# Patient Record
Sex: MALE | Race: WHITE | NOT HISPANIC OR LATINO
[De-identification: names, ages, dates, MRNs, and addresses within clinical notes are randomized per-mention and may not be internally consistent; named-entity substitution may affect disease eponyms.]

---

## 2019-06-19 PROBLEM — Z00.00 ENCOUNTER FOR PREVENTIVE HEALTH EXAMINATION: Status: ACTIVE | Noted: 2019-06-19

## 2019-07-17 ENCOUNTER — APPOINTMENT (OUTPATIENT)
Dept: OTOLARYNGOLOGY | Facility: CLINIC | Age: 49
End: 2019-07-17
Payer: MEDICAID

## 2019-07-17 VITALS
WEIGHT: 207 LBS | BODY MASS INDEX: 36.68 KG/M2 | SYSTOLIC BLOOD PRESSURE: 158 MMHG | DIASTOLIC BLOOD PRESSURE: 96 MMHG | HEART RATE: 95 BPM | HEIGHT: 63 IN

## 2019-07-17 PROCEDURE — 92567 TYMPANOMETRY: CPT

## 2019-07-17 PROCEDURE — 31231 NASAL ENDOSCOPY DX: CPT

## 2019-07-17 PROCEDURE — 99204 OFFICE O/P NEW MOD 45 MIN: CPT | Mod: 25

## 2019-07-17 PROCEDURE — G0268 REMOVAL OF IMPACTED WAX MD: CPT

## 2019-07-17 PROCEDURE — 92557 COMPREHENSIVE HEARING TEST: CPT

## 2019-07-17 NOTE — DATA REVIEWED
[de-identified] : I personally reviewed the patient's audiogram, which shows left mixed hearing loss with large air bone gap, asymmetry in high frequency bone conduction thresholds

## 2019-07-17 NOTE — HISTORY OF PRESENT ILLNESS
[de-identified] : 48M with left hearing loss for years.   Constant, nonfluctuating, gradually worsening.  Recurring ear infections in childhood, always in left ear.  No pain, no drainage currently.  Occasional tinnitus both ears.  Some seasonal allergies and chronic nasal congestion.  Smokes 3 ppd.  No prior ear surgeries or tubes.  Patient is not diabetic.  Distant history of cocaine abuse.

## 2019-07-17 NOTE — REASON FOR VISIT
[Initial Consultation] : an initial consultation for [Hearing Loss] : hearing loss [FreeTextEntry2] : hearing loss

## 2019-07-17 NOTE — PROCEDURE
[FreeTextEntry3] : Procedure note:  Bilateral cerumenectomy\par \par Jul 17, 2019 \par \par Description of Procedure:   Bilateral cerumen impactions were noted requiring debridement under the operating microscope using otologic instrumentation.  The patient tolerated the procedure without complications.\par \par  [FreeTextEntry6] : Procedure note: Nasal endoscopy\par \par Jul 17, 2019 \par \par Description of Procedure:  Nasal endoscopy was performed because of recalcitrant symptoms of nasal obstruction and/or chronic rhinitis, and anterior anatomic abnormalities precluding visualization. Topical decongestant and/or anesthetic was administered to the nasal cavity.  Using a flexible endoscope with sheath, the nasal mucosa, septum, turbinates, and ostiomeatal complex were examined.  \par \par Nasal mucosa findings:  the nasal mucosa was edematous\par Septum findings:  the septum was deviated to the left\par Nasopharynx findings:  the nasopharynx showed enlargement of posterior ET pillar bilaterally, appears symmetric.\par Middle meatus findings:  the middle meatus had no abnormalities.\par Sinuses findings:  the paranasal sinuses had no abnormalities.\par \par Crowding in oropharynx, symmetric enlargement of lingual tonsils, b/l VC mobile, no laryngeal lesions\par

## 2019-07-17 NOTE — PHYSICAL EXAM
[de-identified] : R TM normal, L TM with thickening preventing good visualization of middle ear, attic retraction but no cholesteatoma [Normal] : no rashes

## 2019-07-17 NOTE — REVIEW OF SYSTEMS
[Seasonal Allergies] : seasonal allergies [Negative] : Heme/Lymph [Hearing Loss] : hearing loss [Nasal Congestion] : nasal congestion [Problem Snoring] : problem snoring [Hoarseness] : hoarseness [Eye Pain] : eye pain [Eyes Itch] : itching of the eyes [Shortness Of Breath] : shortness of breath [Wheezing] : wheezing [Cough] : cough [Heartburn] : heartburn [Joint Pain] : joint pain [Depression] : depression [Muscle Weakness] : muscle weakness [FreeTextEntry3] : WATERY EYES [FreeTextEntry6] : NOISY BREATHING [FreeTextEntry1] : SWEATING AT NIGHT, HEADACHE

## 2019-07-31 ENCOUNTER — APPOINTMENT (OUTPATIENT)
Dept: OTOLARYNGOLOGY | Facility: CLINIC | Age: 49
End: 2019-07-31

## 2019-08-28 ENCOUNTER — APPOINTMENT (OUTPATIENT)
Dept: OTOLARYNGOLOGY | Facility: CLINIC | Age: 49
End: 2019-08-28

## 2019-08-28 DIAGNOSIS — H61.23 IMPACTED CERUMEN, BILATERAL: ICD-10-CM

## 2019-08-28 DIAGNOSIS — J31.0 CHRONIC RHINITIS: ICD-10-CM

## 2019-10-16 ENCOUNTER — APPOINTMENT (OUTPATIENT)
Dept: OTOLARYNGOLOGY | Facility: CLINIC | Age: 49
End: 2019-10-16
Payer: MEDICAID

## 2019-10-16 VITALS
HEART RATE: 111 BPM | HEIGHT: 63 IN | DIASTOLIC BLOOD PRESSURE: 100 MMHG | BODY MASS INDEX: 36.68 KG/M2 | WEIGHT: 207 LBS | SYSTOLIC BLOOD PRESSURE: 152 MMHG

## 2019-10-16 DIAGNOSIS — H90.72 MIXED CONDUCTIVE AND SENSORINEURAL HEARING LOSS, UNILATERAL, LEFT EAR, WITH UNRESTRICTED HEARING ON THE CONTRALATERAL SIDE: ICD-10-CM

## 2019-10-16 DIAGNOSIS — H68.022 CHRONIC EUSTACHIAN SALPINGITIS, LEFT EAR: ICD-10-CM

## 2019-10-16 PROCEDURE — 69433 CREATE EARDRUM OPENING: CPT | Mod: LT

## 2019-10-16 PROCEDURE — 99214 OFFICE O/P EST MOD 30 MIN: CPT | Mod: 25

## 2019-10-16 PROCEDURE — 92567 TYMPANOMETRY: CPT

## 2019-10-16 PROCEDURE — 92553 AUDIOMETRY AIR & BONE: CPT

## 2019-10-16 NOTE — PROCEDURE
[FreeTextEntry3] : Procedure note: Left myringotomy and tube insertion\par \par Oct 16, 2019 \par \par Description of Operative Procedure:  Risks, benefits, and alternatives of the planned procedure were discussed with the patient prior to proceeding.  Risks would include but are not limited to bleeding, infection, persistent drainage, persistent perforation, or failure to improve hearing.  Benefits would include improvement in hearing.  Topical anesthetic was used to anesthetize the tympanic membrane.  A myringotomy was made.  Serous fluid was suctioned.  A size 1.14 Paparella tube was placed followed by Ciprodex drops.  The patient tolerated the procedure without complications.\par \par

## 2019-11-27 ENCOUNTER — APPOINTMENT (OUTPATIENT)
Dept: OTOLARYNGOLOGY | Facility: CLINIC | Age: 49
End: 2019-11-27

## 2021-03-03 ENCOUNTER — APPOINTMENT (OUTPATIENT)
Dept: HEMATOLOGY ONCOLOGY | Facility: CLINIC | Age: 51
End: 2021-03-03
Payer: MEDICAID

## 2021-03-03 VITALS
DIASTOLIC BLOOD PRESSURE: 88 MMHG | BODY MASS INDEX: 44.48 KG/M2 | OXYGEN SATURATION: 95 % | SYSTOLIC BLOOD PRESSURE: 133 MMHG | WEIGHT: 251.01 LBS | HEART RATE: 111 BPM | HEIGHT: 63 IN | TEMPERATURE: 97.3 F

## 2021-03-03 DIAGNOSIS — F32.9 ANXIETY DISORDER, UNSPECIFIED: ICD-10-CM

## 2021-03-03 DIAGNOSIS — Z02.82 ENCOUNTER FOR ADOPTION SERVICES: ICD-10-CM

## 2021-03-03 DIAGNOSIS — Z87.09 PERSONAL HISTORY OF OTHER DISEASES OF THE RESPIRATORY SYSTEM: ICD-10-CM

## 2021-03-03 DIAGNOSIS — R73.03 PREDIABETES.: ICD-10-CM

## 2021-03-03 DIAGNOSIS — Z87.898 PERSONAL HISTORY OF OTHER SPECIFIED CONDITIONS: ICD-10-CM

## 2021-03-03 DIAGNOSIS — Z80.9 FAMILY HISTORY OF MALIGNANT NEOPLASM, UNSPECIFIED: ICD-10-CM

## 2021-03-03 DIAGNOSIS — D75.1 SECONDARY POLYCYTHEMIA: ICD-10-CM

## 2021-03-03 DIAGNOSIS — F17.200 NICOTINE DEPENDENCE, UNSPECIFIED, UNCOMPLICATED: ICD-10-CM

## 2021-03-03 DIAGNOSIS — Z86.39 PERSONAL HISTORY OF OTHER ENDOCRINE, NUTRITIONAL AND METABOLIC DISEASE: ICD-10-CM

## 2021-03-03 DIAGNOSIS — Z87.39 PERSONAL HISTORY OF OTHER DISEASES OF THE MUSCULOSKELETAL SYSTEM AND CONNECTIVE TISSUE: ICD-10-CM

## 2021-03-03 DIAGNOSIS — F41.9 ANXIETY DISORDER, UNSPECIFIED: ICD-10-CM

## 2021-03-03 PROCEDURE — 99072 ADDL SUPL MATRL&STAF TM PHE: CPT

## 2021-03-03 PROCEDURE — 99205 OFFICE O/P NEW HI 60 MIN: CPT

## 2021-03-03 NOTE — REVIEW OF SYSTEMS
[Patient Intake Form Reviewed] : Patient intake form was reviewed [Fever] : no fever [Chills] : no chills [Fatigue] : fatigue [Recent Change In Weight] : ~T recent weight change [Vision Problems] : no vision problems [Dysphagia] : no dysphagia [Loss of Hearing] : loss of hearing [Chest Pain] : no chest pain [Leg Claudication] : no intermittent leg claudication [Lower Ext Edema] : no lower extremity edema [Shortness Of Breath] : shortness of breath [Wheezing] : wheezing [Cough] : no cough [Abdominal Pain] : no abdominal pain [Joint Pain] : joint pain [Joint Stiffness] : joint stiffness [Skin Rash] : no skin rash [Fainting] : no fainting [Difficulty Walking] : no difficulty walking [Insomnia] : insomnia [Anxiety] : anxiety [Depression] : depression [Easy Bleeding] : no tendency for easy bleeding [Easy Bruising] : no tendency for easy bruising [Swollen Glands] : no swollen glands [FreeTextEntry2] : weight gain [FreeTextEntry4] : chronic hearing loss left ear

## 2021-03-03 NOTE — RESULTS/DATA
[FreeTextEntry1] : Mr. Leyva is a pleasant 50 year-old man, heavy smoker, with multiple medical problems, here for evaluation of neutrophil-predominant leukocytosis.

## 2021-03-03 NOTE — CONSULT LETTER
[Dear  ___] : Dear  [unfilled], [Consult Letter:] : I had the pleasure of evaluating your patient, [unfilled]. [Please see my note below.] : Please see my note below. [Consult Closing:] : Thank you very much for allowing me to participate in the care of this patient.  If you have any questions, please do not hesitate to contact me. [Sincerely,] : Sincerely, [FreeTextEntry2] : Dr. Bob Rojas [FreeTextEntry3] : Frankie Stock MD\par

## 2021-03-03 NOTE — HISTORY OF PRESENT ILLNESS
[de-identified] : Jose G has a long-standing history of degenerative back disease, hypertension and hyperlipidemia. He follows closely with Dr. Rojas. On recent blood work, he was found to have an elevated white blood cell count of 16.5, with a predominance of neutrophils. He was also noted to have a mildly elevated hemoglobin of 17.2g. A C-reactive protein checked at that time was elevated.\par \par He has previously seen a rheumatologist, with work-up for possible inflammatory arthritis reportedly unrevealing. He is pending neurosurgical evaluation. He has bilateral nodular lesions on the extensor surfaces of his forearms, which have developed after chronically leaning on his forearms due to his back issues.\par \par Jose G smokes 2-3 packs of cigarettes per day, is pending sleep study for possible sleep apnea. \par \par He denies any ongoing fevers or chills, no headache, no lumps or bumps, no weight loss, no bleeding or bruising. He is not on any steroids, though has been previously for COPD.\par \par

## 2021-03-03 NOTE — PHYSICAL EXAM
[Restricted in physically strenuous activity but ambulatory and able to carry out work of a light or sedentary nature] : Status 1- Restricted in physically strenuous activity but ambulatory and able to carry out work of a light or sedentary nature, e.g., light house work, office work [Obese] : obese [Normal] : affect appropriate [de-identified] : anicteric [de-identified] : Decreased air entry bilaterally with diffuse expiratory wheezing

## 2021-03-04 LAB
BASOPHILS # BLD AUTO: 0.1 K/UL
BASOPHILS NFR BLD AUTO: 0.6 %
EOSINOPHIL # BLD AUTO: 0.32 K/UL
EOSINOPHIL NFR BLD AUTO: 2 %
HCT VFR BLD CALC: 50.7 %
HGB BLD-MCNC: 16.6 G/DL
IMM GRANULOCYTES NFR BLD AUTO: 1 %
LYMPHOCYTES # BLD AUTO: 3.74 K/UL
LYMPHOCYTES NFR BLD AUTO: 23.7 %
MAN DIFF?: NORMAL
MCHC RBC-ENTMCNC: 32 PG
MCHC RBC-ENTMCNC: 32.7 GM/DL
MCV RBC AUTO: 97.7 FL
MONOCYTES # BLD AUTO: 1.41 K/UL
MONOCYTES NFR BLD AUTO: 9 %
NEUTROPHILS # BLD AUTO: 10.03 K/UL
NEUTROPHILS NFR BLD AUTO: 63.7 %
PLATELET # BLD AUTO: 409 K/UL
RBC # BLD: 5.19 M/UL
RBC # FLD: 12.1 %
WBC # FLD AUTO: 15.75 K/UL

## 2021-07-02 ENCOUNTER — APPOINTMENT (OUTPATIENT)
Dept: HEMATOLOGY ONCOLOGY | Facility: CLINIC | Age: 51
End: 2021-07-02

## 2021-07-02 ENCOUNTER — OUTPATIENT (OUTPATIENT)
Dept: OUTPATIENT SERVICES | Facility: HOSPITAL | Age: 51
LOS: 1 days | End: 2021-07-02

## 2021-07-02 DIAGNOSIS — D72.829 ELEVATED WHITE BLOOD CELL COUNT, UNSPECIFIED: ICD-10-CM

## 2021-07-13 ENCOUNTER — TRANSCRIPTION ENCOUNTER (OUTPATIENT)
Age: 51
End: 2021-07-13

## 2021-07-13 ENCOUNTER — APPOINTMENT (OUTPATIENT)
Dept: NEUROSURGERY | Facility: CLINIC | Age: 51
End: 2021-07-13
Payer: MEDICAID

## 2021-07-13 VITALS
HEART RATE: 86 BPM | DIASTOLIC BLOOD PRESSURE: 80 MMHG | WEIGHT: 230 LBS | SYSTOLIC BLOOD PRESSURE: 130 MMHG | TEMPERATURE: 97.6 F | BODY MASS INDEX: 40.75 KG/M2 | HEIGHT: 63 IN

## 2021-07-13 PROCEDURE — 99204 OFFICE O/P NEW MOD 45 MIN: CPT

## 2021-07-13 RX ORDER — ALBUTEROL 90 MCG
AEROSOL (GRAM) INHALATION
Refills: 0 | Status: ACTIVE | COMMUNITY

## 2021-07-13 RX ORDER — UMECLIDINIUM BROMIDE AND VILANTEROL TRIFENATATE 62.5; 25 UG/1; UG/1
62.5-25 POWDER RESPIRATORY (INHALATION)
Refills: 0 | Status: ACTIVE | COMMUNITY

## 2021-07-13 NOTE — RESULTS/DATA
[FreeTextEntry1] : [facility] imaging of the lumbar spine shows some degenerative changes at the lowest level with some mild disc disease without significant neural impingement. It is noted that there is a transitional anatomy at the lowest level. More notable however is a cervical cord syrinx. It extends all the way from C2 down to the thoracic spine however the thoracic spine is not image.  On the sagittal cuts to my eye it appears he's got a Chiari malformation.

## 2021-07-13 NOTE — PLAN
[FreeTextEntry1] : This is a patient with a cervicothoracic syrinx and what is likely a diagnosis Chiari malformation. Recommended an MRI of the brain. I recommended an MRI of the thoracic spine to fully evaluate him. He truly is a Chiari malformation he may benefit from decompression to treat that syrinx. Otherwise like to know that there is no other etiologies such as tumor otherwise a benign image regions. I discussed this with the patient will follow up with me with the rest of the imaging hand for any decisions are made.

## 2021-07-13 NOTE — HISTORY OF PRESENT ILLNESS
[de-identified] : \par Jose G is here for an evaluation primarily of the cervical spine. Comes complaining however of only back pain. That's his main complaint in what is missing his pain management specialist for. He is an MRI of the lumbar spine as well as an MRI of the cervical spine done\par Her posterior for my review. I think what was found incidentally was that he has a large cervical syrinx. There is no MRI of the brain or the thoracic spine for my review. He complains of an interview of some symptoms that suggest numbness and tingling in the hands and some balance difficulties but these were only brought out MI questioning and a part of his primary complaint.\par \par \par \par PM  Hoon Caridad\par PCP  Crystal\par

## 2021-07-13 NOTE — PHYSICAL EXAM
[FreeTextEntry6] : He has diffusely increased tone however his motor examination is otherwise grossly unremarkable. [Antalgic] : antalgic [Able to toe walk] : the patient was not able to toe walk [Able to heel walk] : the patient was not able to heel walk

## 2021-07-13 NOTE — REASON FOR VISIT
[New Patient Visit] : a new patient visit [Referred By: _________] : Patient was referred by BARRON [FreeTextEntry1] : Cervical Syrinx- [facility]

## 2021-07-29 ENCOUNTER — APPOINTMENT (OUTPATIENT)
Dept: NEUROSURGERY | Facility: CLINIC | Age: 51
End: 2021-07-29
Payer: MEDICAID

## 2021-07-29 VITALS
BODY MASS INDEX: 40.75 KG/M2 | HEART RATE: 91 BPM | SYSTOLIC BLOOD PRESSURE: 120 MMHG | HEIGHT: 63 IN | WEIGHT: 230 LBS | DIASTOLIC BLOOD PRESSURE: 70 MMHG | TEMPERATURE: 97.3 F

## 2021-07-29 PROCEDURE — 99213 OFFICE O/P EST LOW 20 MIN: CPT

## 2021-07-29 NOTE — REASON FOR VISIT
[Follow-Up: _____] : a [unfilled] follow-up visit [FreeTextEntry1] : Jose G is here for a clinical follow-up evaluation and imaging review.  He has a cervical thoracic syrinx.  We got MRIs of his thoracic spine more properly and also of the brain.  He is complaining of increasing headaches and some gait difficulties.  He walks with a cane.

## 2021-07-29 NOTE — RESULTS/DATA
[FreeTextEntry1] : Staci Stauffer MRI reveals what is reported as basilar invagination.  It appears that she probably does have some degree of this in addition to Chiari malformation.  This is likely the etiology of his syrinx.  Is hard to know the images are somewhat limited.  Thoracic spine study somewhat incomplete showing a scoliotic curve without evidence of tumor.

## 2021-07-29 NOTE — ASSESSMENT
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  I discussed with him the different treatment options of consideration.  If the etiology of his syrinx is the invagination/Chiari then consideration for occipital cervical decompression fusion was discussed.  This is obviously quite disabling in terms of the fusion and noted loss of range of motion of the cervical spine.  I have told him however if the syrinx increases his neurologic disability could worsen.  We have to balance the risk benefits of surgery and the associated byproducts of treatment.\par \par I have given him structures to get other opinions about this and have directed him to [facility] and [facility] for expert opinions and treatment.  If the OC fusion needs to be done he may be better to consider it at a tertiary care level hospital setting.\par \par You get back to me regarding his wishes and after the other opinions have been rendered.

## 2021-07-31 ENCOUNTER — NON-APPOINTMENT (OUTPATIENT)
Age: 51
End: 2021-07-31

## 2021-08-19 ENCOUNTER — APPOINTMENT (OUTPATIENT)
Dept: NEUROSURGERY | Facility: CLINIC | Age: 51
End: 2021-08-19
Payer: MEDICAID

## 2021-08-19 VITALS
DIASTOLIC BLOOD PRESSURE: 83 MMHG | SYSTOLIC BLOOD PRESSURE: 142 MMHG | HEIGHT: 63 IN | TEMPERATURE: 98.6 F | WEIGHT: 230 LBS | HEART RATE: 101 BPM | BODY MASS INDEX: 40.75 KG/M2 | OXYGEN SATURATION: 95 %

## 2021-08-19 PROCEDURE — 99213 OFFICE O/P EST LOW 20 MIN: CPT

## 2021-08-19 NOTE — REVIEW OF SYSTEMS
[Feeling Poorly] : feeling poorly [Feeling Tired] : feeling tired [Recent Weight Gain (___ Lbs)] : recent [unfilled] ~Ulb weight gain [Sleep Disturbances] : sleep disturbances [Anxiety] : anxiety [Confused or Disoriented] : confusion [Decr. Concentrating Ability] : decreased concentrating ability [Changed Thought Patterns] : changed thought patterns [Facial Weakness] : facial weakness [Arm Weakness] : arm weakness [Hand Weakness] :  hand weakness [Leg Weakness] : leg weakness [Poor Coordination] : poor coordination [Numbness] : numbness [Tingling] : tingling [Dizziness] : dizziness [Lightheadedness] : lightheadedness [Migraine Headache] : migraine headaches [Difficulty Walking] : difficulty walking [Inability to Walk] : inability to walk [Limping] : limping

## 2021-08-20 NOTE — HISTORY OF PRESENT ILLNESS
[> 3 months] : more  than 3 months [FreeTextEntry1] : neck and posterior headaches and pain.  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  Patient is a current day smoker 2-3 packs per day.   He mentions his symptoms have been gradually worsening over the last year.  Denies any trauma or injury.   Patient endorses neck/interscapular pain with intermittent numbness/tingling of UE's.  Pain intensity 7/10.  He endorses posterior headaches which wax and wane.  Denies any saddle anesthesia, urinary or bowel incontinence.\par He ambulates with assistance of a cane.  He endorses difficulty with balance.  No falls.  Patient states he has tried physical therapy which exacerbates his pain.  He is under the care of Dr. Mclean (pain management).  He has not responded to pain management treatments.  He presents for consideration of surgical options.  \par

## 2021-08-20 NOTE — PHYSICAL EXAM
[General Appearance - Alert] : alert [General Appearance - In No Acute Distress] : in no acute distress [Oriented To Time, Place, And Person] : oriented to person, place, and time [Impaired Insight] : insight and judgment were intact [Affect] : the affect was normal [Person] : oriented to person [Place] : oriented to place [Time] : oriented to time [Short Term Intact] : short term memory intact [Fluency] : fluency intact [Comprehension] : comprehension intact [Cranial Nerves Oculomotor (III)] : extraocular motion intact [Cranial Nerves Optic (II)] : visual acuity intact bilaterally,  pupils equal round and reactive to light [Cranial Nerves Trigeminal (V)] : facial sensation intact symmetrically [Cranial Nerves Facial (VII)] : face symmetrical [Cranial Nerves Glossopharyngeal (IX)] : tongue and palate midline [Cranial Nerves Accessory (XI - Cranial And Spinal)] : head turning and shoulder shrug symmetric [Cranial Nerves Hypoglossal (XII)] : there was no tongue deviation with protrusion [Motor Tone] : muscle tone was normal in all four extremities [Motor Strength] : muscle strength was normal in all four extremities [4] : L3 quadriceps 4/5 [5] : S1 toe walking 5/5 [Sensation Tactile Decrease] : light touch was intact [Sensation Pain / Temperature Decrease] : pain and temperature was intact [Antalgic] : antalgic [FreeTextEntry8] : ambulates with assistance of a cane.  [FreeTextEntry9] : ambulates with a cane.

## 2021-08-20 NOTE — REASON FOR VISIT
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

## 2021-08-20 NOTE — ASSESSMENT
[FreeTextEntry1] : Mr. Leyva presents with above history and imaging.  Patient is neurologically intact.  He presents with imaging that reveals basilar invagination and syrinx of the cervical and thoracic spinal cord.  We discussed in detail smoking cessation.   \par Plan:\par MRI cervical and thoracic spine w/wo contrast to assess for any enhancement of the syrinx.\par MRI head with CINE to assess for any CSF obstruction.\par CT cervical spine to assess bony structures.\par Patient will follow up with Dr. Muñoz as well.\par Follow up after imaging to discuss imaging findings.\par Smoking cessation discussed.  Currently smoking 2-3 packs per day. \par Patient knows to call the office if there are any new or worsening symptoms.\par Patient has been given an opportunity to ask and have their questions answered to their satisfaction.\par \par I, Dr. Nash Araiza, personally performed the evaluation and management (E/M) services for this patient.  That E/M includes assessment of the initial examination, assessing the pertinent medical and surgical history, and establishing the plan of care.  At the time of the visit, my ACP, Sandy Lamar, actively participated in the evaluation and management services for this patient to be followed going forward in accordance with the plan documented in the clinical note.\par \par

## 2021-08-24 ENCOUNTER — APPOINTMENT (OUTPATIENT)
Dept: NEUROSURGERY | Facility: CLINIC | Age: 51
End: 2021-08-24

## 2021-09-23 ENCOUNTER — APPOINTMENT (OUTPATIENT)
Dept: NEUROSURGERY | Facility: CLINIC | Age: 51
End: 2021-09-23
Payer: MEDICAID

## 2021-09-23 VITALS
SYSTOLIC BLOOD PRESSURE: 118 MMHG | HEART RATE: 110 BPM | DIASTOLIC BLOOD PRESSURE: 77 MMHG | OXYGEN SATURATION: 93 % | TEMPERATURE: 97.8 F

## 2021-09-23 PROCEDURE — 99213 OFFICE O/P EST LOW 20 MIN: CPT

## 2021-09-27 NOTE — END OF VISIT
[Time Spent: ___ minutes] : I have spent [unfilled] minutes of time on the encounter. glen all pertinent systems normal

## 2021-09-27 NOTE — ASSESSMENT
[FreeTextEntry1] : Mr. Leyva presents with above history and imaging.  He presents with imaging that reveals basilar invagination and syrinx of the cervical and thoracic spinal cord.  We discussed in detail smoking cessation.   \par Plan:\par \par Patient will follow up with Dr. Muñoz as well.\par Smoking cessation discussed.  Currently smoking 2-3 packs per day. \par Patient knows to call the office if there are any new or worsening symptoms.\par Patient has been given an opportunity to ask and have their questions answered to their satisfaction.  \par \par The patient understands that he may likely be an appropriate candidate for posterior decompression and O-C fusion.  I will discuss the case further with Dr. Muñoz after his follow-up next week.\par \par I, Dr. Nash Araiza, personally performed the evaluation and management (E/M) services for this patient.  That E/M includes assessment of the initial examination, assessing the pertinent medical and surgical history, and establishing the plan of care.  At the time of the visit, my ACP, Sandy Lamar, actively participated in the evaluation and management services for this patient to be followed going forward in accordance with the plan documented in the clinical note.\par \par

## 2021-09-27 NOTE — PHYSICAL EXAM
[General Appearance - Alert] : alert [Oriented To Time, Place, And Person] : oriented to person, place, and time [General Appearance - In No Acute Distress] : in no acute distress [Impaired Insight] : insight and judgment were intact [Affect] : the affect was normal [Person] : oriented to person [Place] : oriented to place [Time] : oriented to time [Short Term Intact] : short term memory intact [Fluency] : fluency intact [Comprehension] : comprehension intact [Cranial Nerves Optic (II)] : visual acuity intact bilaterally,  pupils equal round and reactive to light [Cranial Nerves Oculomotor (III)] : extraocular motion intact [Cranial Nerves Trigeminal (V)] : facial sensation intact symmetrically [Cranial Nerves Facial (VII)] : face symmetrical [Cranial Nerves Glossopharyngeal (IX)] : tongue and palate midline [Cranial Nerves Accessory (XI - Cranial And Spinal)] : head turning and shoulder shrug symmetric [Cranial Nerves Hypoglossal (XII)] : there was no tongue deviation with protrusion [Motor Tone] : muscle tone was normal in all four extremities [4] : L3 quadriceps 4/5 [5] : S1 toe walking 5/5 [Sensation Tactile Decrease] : light touch was intact [Sensation Pain / Temperature Decrease] : pain and temperature was intact [Antalgic] : antalgic [FreeTextEntry6] : bilateral Hg weakness 4/5 [FreeTextEntry8] : ambulates with assistance of a cane.  [FreeTextEntry9] : ambulates with a cane.

## 2021-09-28 ENCOUNTER — APPOINTMENT (OUTPATIENT)
Dept: NEUROSURGERY | Facility: CLINIC | Age: 51
End: 2021-09-28
Payer: MEDICAID

## 2021-09-28 VITALS
TEMPERATURE: 96.7 F | DIASTOLIC BLOOD PRESSURE: 80 MMHG | HEART RATE: 96 BPM | WEIGHT: 230 LBS | SYSTOLIC BLOOD PRESSURE: 140 MMHG | BODY MASS INDEX: 40.75 KG/M2 | HEIGHT: 63 IN

## 2021-09-28 PROCEDURE — 99213 OFFICE O/P EST LOW 20 MIN: CPT

## 2021-09-28 NOTE — ASSESSMENT
[FreeTextEntry1] : This is a patient with basilar invagination a spinal cord syrinx and significant symptomatology.  At this point we discussed various treatment options and given the imaging findings and the clinical findings posterior decompression with occipital cervical fusion may be reasonable.  Treatment of the syrinx directly is also an option to consider however at this point having him pursue his care with Dr. Araiza at the [facility] where there is a full neuro ICU, 24/7 house-staff, and tertiary care level services.

## 2021-09-28 NOTE — REASON FOR VISIT
[Follow-Up: _____] : a [unfilled] follow-up visit [FreeTextEntry1] : \par I saw Mr. Leyva in the office today for a follow-up visit.  He has had several imaging studies including CAT scans and MRI of the cervical thoracic spine as well as the brain.  He has basilar invagination and a syrinx extending from the cervical thoracic cord.  He has been following with my associate Dr. Araiza.  He is a candidate for potentially OC fusion and treatment for his basilar invagination.  Question is whether or not also to treat that syrinx directly with pharyngostomy.  Discussed a lot of these things with him today in the office and at this point I recommended that he have his care undertaken at a tertiary care center such as [facility].  He states his headaches and pain are getting worse by the day.  His gait and his neurologic function seems to be suffering.

## 2021-09-30 ENCOUNTER — APPOINTMENT (OUTPATIENT)
Dept: NEUROSURGERY | Facility: CLINIC | Age: 51
End: 2021-09-30
Payer: MEDICAID

## 2021-09-30 VITALS
OXYGEN SATURATION: 94 % | WEIGHT: 240 LBS | BODY MASS INDEX: 42.52 KG/M2 | HEART RATE: 100 BPM | HEIGHT: 63 IN | DIASTOLIC BLOOD PRESSURE: 75 MMHG | TEMPERATURE: 98.1 F | SYSTOLIC BLOOD PRESSURE: 124 MMHG

## 2021-09-30 PROCEDURE — 99214 OFFICE O/P EST MOD 30 MIN: CPT | Mod: 57

## 2021-10-04 ENCOUNTER — APPOINTMENT (OUTPATIENT)
Dept: RHEUMATOLOGY | Facility: CLINIC | Age: 51
End: 2021-10-04

## 2021-10-04 NOTE — REASON FOR VISIT
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

## 2021-10-04 NOTE — END OF VISIT
[Time Spent: ___ minutes] : I have spent [unfilled] minutes of time on the encounter.
Bladder non-tender and non-distended. Urine clear yellow.

## 2021-10-04 NOTE — PHYSICAL EXAM
[General Appearance - Alert] : alert [General Appearance - In No Acute Distress] : in no acute distress [Oriented To Time, Place, And Person] : oriented to person, place, and time [Impaired Insight] : insight and judgment were intact [Affect] : the affect was normal [Person] : oriented to person [Place] : oriented to place [Time] : oriented to time [Short Term Intact] : short term memory intact [Fluency] : fluency intact [Comprehension] : comprehension intact [Cranial Nerves Optic (II)] : visual acuity intact bilaterally,  pupils equal round and reactive to light [Cranial Nerves Oculomotor (III)] : extraocular motion intact [Cranial Nerves Trigeminal (V)] : facial sensation intact symmetrically [Cranial Nerves Facial (VII)] : face symmetrical [Cranial Nerves Glossopharyngeal (IX)] : tongue and palate midline [Cranial Nerves Accessory (XI - Cranial And Spinal)] : head turning and shoulder shrug symmetric [Cranial Nerves Hypoglossal (XII)] : there was no tongue deviation with protrusion [Motor Tone] : muscle tone was normal in all four extremities [4] : L3 quadriceps 4/5 [5] : S1 toe walking 5/5 [Sensation Tactile Decrease] : light touch was intact [Sensation Pain / Temperature Decrease] : pain and temperature was intact [Antalgic] : antalgic [FreeTextEntry6] : bilateral Hg weakness 4/5 [FreeTextEntry8] : ambulates with assistance of a cane.  [FreeTextEntry9] : ambulates with a cane.

## 2021-10-04 NOTE — HISTORY OF PRESENT ILLNESS
[> 3 months] : more  than 3 months [FreeTextEntry1] : neck and posterior headaches and pain.  [de-identified] : Mr. Archer presents for follow-up.  He continues to endorse neck pain and bilateral hand weakness.  He has persistent gait difficulty and is ambulatory with a cane.  He was recently seen and evaluated by Dr. Muñoz.\par

## 2021-10-12 ENCOUNTER — OUTPATIENT (OUTPATIENT)
Dept: OUTPATIENT SERVICES | Facility: HOSPITAL | Age: 51
LOS: 1 days | End: 2021-10-12
Payer: MEDICAID

## 2021-10-12 VITALS
HEIGHT: 62 IN | SYSTOLIC BLOOD PRESSURE: 117 MMHG | DIASTOLIC BLOOD PRESSURE: 75 MMHG | WEIGHT: 249.78 LBS | RESPIRATION RATE: 16 BRPM | HEART RATE: 95 BPM | TEMPERATURE: 97 F

## 2021-10-12 DIAGNOSIS — E11.9 TYPE 2 DIABETES MELLITUS WITHOUT COMPLICATIONS: ICD-10-CM

## 2021-10-12 DIAGNOSIS — Z01.818 ENCOUNTER FOR OTHER PREPROCEDURAL EXAMINATION: ICD-10-CM

## 2021-10-12 DIAGNOSIS — G93.5 COMPRESSION OF BRAIN: ICD-10-CM

## 2021-10-12 DIAGNOSIS — Z13.89 ENCOUNTER FOR SCREENING FOR OTHER DISORDER: ICD-10-CM

## 2021-10-12 DIAGNOSIS — Z90.49 ACQUIRED ABSENCE OF OTHER SPECIFIED PARTS OF DIGESTIVE TRACT: Chronic | ICD-10-CM

## 2021-10-12 DIAGNOSIS — G47.33 OBSTRUCTIVE SLEEP APNEA (ADULT) (PEDIATRIC): ICD-10-CM

## 2021-10-12 DIAGNOSIS — I10 ESSENTIAL (PRIMARY) HYPERTENSION: ICD-10-CM

## 2021-10-12 DIAGNOSIS — J44.9 CHRONIC OBSTRUCTIVE PULMONARY DISEASE, UNSPECIFIED: ICD-10-CM

## 2021-10-12 LAB
A1C WITH ESTIMATED AVERAGE GLUCOSE RESULT: 8.3 % — HIGH (ref 4–5.6)
ANION GAP SERPL CALC-SCNC: 17 MMOL/L — SIGNIFICANT CHANGE UP (ref 5–17)
APTT BLD: 32.6 SEC — SIGNIFICANT CHANGE UP (ref 27.5–35.5)
BLD GP AB SCN SERPL QL: SIGNIFICANT CHANGE UP
BUN SERPL-MCNC: 12.8 MG/DL — SIGNIFICANT CHANGE UP (ref 8–20)
CALCIUM SERPL-MCNC: 9.6 MG/DL — SIGNIFICANT CHANGE UP (ref 8.6–10.2)
CHLORIDE SERPL-SCNC: 99 MMOL/L — SIGNIFICANT CHANGE UP (ref 98–107)
CO2 SERPL-SCNC: 23 MMOL/L — SIGNIFICANT CHANGE UP (ref 22–29)
CREAT SERPL-MCNC: 0.67 MG/DL — SIGNIFICANT CHANGE UP (ref 0.5–1.3)
ESTIMATED AVERAGE GLUCOSE: 192 MG/DL — HIGH (ref 68–114)
GLUCOSE SERPL-MCNC: 117 MG/DL — HIGH (ref 70–99)
HCT VFR BLD CALC: 52.1 % — HIGH (ref 39–50)
HGB BLD-MCNC: 17.2 G/DL — HIGH (ref 13–17)
INR BLD: 1.01 RATIO — SIGNIFICANT CHANGE UP (ref 0.88–1.16)
MCHC RBC-ENTMCNC: 32 PG — SIGNIFICANT CHANGE UP (ref 27–34)
MCHC RBC-ENTMCNC: 33 GM/DL — SIGNIFICANT CHANGE UP (ref 32–36)
MCV RBC AUTO: 97 FL — SIGNIFICANT CHANGE UP (ref 80–100)
PLATELET # BLD AUTO: 390 K/UL — SIGNIFICANT CHANGE UP (ref 150–400)
POTASSIUM SERPL-MCNC: 4.6 MMOL/L — SIGNIFICANT CHANGE UP (ref 3.5–5.3)
POTASSIUM SERPL-SCNC: 4.6 MMOL/L — SIGNIFICANT CHANGE UP (ref 3.5–5.3)
PROTHROM AB SERPL-ACNC: 11.7 SEC — SIGNIFICANT CHANGE UP (ref 10.6–13.6)
RBC # BLD: 5.37 M/UL — SIGNIFICANT CHANGE UP (ref 4.2–5.8)
RBC # FLD: 11.9 % — SIGNIFICANT CHANGE UP (ref 10.3–14.5)
SODIUM SERPL-SCNC: 139 MMOL/L — SIGNIFICANT CHANGE UP (ref 135–145)
WBC # BLD: 13.97 K/UL — HIGH (ref 3.8–10.5)
WBC # FLD AUTO: 13.97 K/UL — HIGH (ref 3.8–10.5)

## 2021-10-12 PROCEDURE — G0463: CPT

## 2021-10-12 PROCEDURE — 93005 ELECTROCARDIOGRAM TRACING: CPT

## 2021-10-12 PROCEDURE — 93010 ELECTROCARDIOGRAM REPORT: CPT

## 2021-10-12 NOTE — H&P PST ADULT - LAB RESULTS AND INTERPRETATION
hgb a1c 8.3   wbc  13.97   results faxed to DR. Pirce ( [facility])  and faxed to DR. Rojas. K DAmico NP 10/12/21 230 pm

## 2021-10-12 NOTE — H&P PST ADULT - PROBLEM SELECTOR PLAN 7
suboccipital craniectomy and duraplasty for decompression of Chiari malformation by Dr. Araiza on 10/20/21. Covid vaccine series completed, card in chart. J&J 6/6/21. covid  test is on 10/17/21. Medical and cardiac clearance pending

## 2021-10-12 NOTE — H&P PST ADULT - NSICDXPASTMEDICALHX_GEN_ALL_CORE_FT
PAST MEDICAL HISTORY:  COPD (chronic obstructive pulmonary disease)     Diabetes mellitus     Hyperlipidemia     Hypertension     Lumbar herniated disc     Obstructive sleep apnea use CPAP

## 2021-10-12 NOTE — H&P PST ADULT - HISTORY OF PRESENT ILLNESS
maximo and posterior headaches and pain.   Duration of Symptom: more than 3 months   Mr. Archer presents for follow-up. He continues to endorse neck pain and bilateral hand weakness. He has persistent gait difficulty and is ambulatory with a cane. He was recently seen and evaluated by Dr. Muñoz.   maximo and posterior headaches and pain.   Duration of Symptom: more than 3 months   Mr. Archer presents for follow-up. He continues to endorse neck pain and bilateral hand weakness. He has persistent gait difficulty and is ambulatory with a cane. He was recently seen and evaluated by Dr. Muñoz.    51 year old male who states he has herniated disc and pain in his lower back and in his neck and had a MRI which showed a Chiari malformation, he is scheduled for a suboccipital craniectomy and duraplasty for decompression of Chiari malformation by Dr. Araiza on 10/20/21. Covid vaccine series completed, card in chart. J&J 6/6/21. covid  test is on 10/17/21. he continue to have neck and lower back pain. Medical and cardiac clearance pending

## 2021-10-12 NOTE — H&P PST ADULT - REASON FOR ADMISSION
"decompression of Chiari  maximo and posterior headaches and pain.   Duration of Symptom: more than 3 months   Mr. Archer presents for follow-up. He continues to endorse neck pain and bilateral hand weakness. He has persistent gait difficulty and is ambulatory with a cane. He was recently seen and evaluated by Dr. Muñoz. "decompression of Chiari"

## 2021-10-12 NOTE — H&P PST ADULT - ASSESSMENT
medications reviewed, instructions given on what medications to take and what not to take. Please take, Losartan, gabapentin with one sip of water. use Albuterol, and Anoro in the AM of surgery. Asked the pt not to take DM meds on the DOP stop mobic one week prior, all other meds can be continued till the day before surgery.     CAPRINI VTE 2.0 SCORE [CLOT updated 2019]    AGE RELATED RISK FACTORS                                                       MOBILITY RELATED FACTORS  [ ] Age 41-60 years                                            (1 Point)                    [ ] Bed rest                                                        (1 Point)  [ ] Age: 61-74 years                                           (2 Points)                  [ ] Plaster cast                                                   (2 Points)  [ ] Age= 75 years                                              (3 Points)                    [ ] Bed bound for more than 72 hours                 (2 Points)    DISEASE RELATED RISK FACTORS                                               GENDER SPECIFIC FACTORS  [ ] Edema in the lower extremities                       (1 Point)              [ ] Pregnancy                                                     (1 Point)  [ ] Varicose veins                                               (1 Point)                     [ ] Post-partum < 6 weeks                                   (1 Point)             [ ] BMI > 25 Kg/m2                                            (1 Point)                     [ ] Hormonal therapy  or oral contraception          (1 Point)                 [ ] Sepsis (in the previous month)                        (1 Point)               [ ] History of pregnancy complications                 (1 point)  [ ] Pneumonia or serious lung disease                                               [ ] Unexplained or recurrent                     (1 Point)           (in the previous month)                               (1 Point)  [ ] Abnormal pulmonary function test                     (1 Point)                 SURGERY RELATED RISK FACTORS  [ ] Acute myocardial infarction                              (1 Point)               [ ]  Section                                             (1 Point)  [ ] Congestive heart failure (in the previous month)  (1 Point)      [ ] Minor surgery                                                  (1 Point)   [ ] Inflammatory bowel disease                             (1 Point)               [ ] Arthroscopic surgery                                        (2 Points)  [ ] Central venous access                                      (2 Points)                [ ] General surgery lasting more than 45 minutes (2 points)  [ ] Malignancy- Present or previous                   (2 Points)                [ ] Elective arthroplasty                                         (5 points)    [ ] Stroke (in the previous month)                          (5 Points)                                                                                                                                                           HEMATOLOGY RELATED FACTORS                                                 TRAUMA RELATED RISK FACTORS  [ ] Prior episodes of VTE                                     (3 Points)                [ ] Fracture of the hip, pelvis, or leg                       (5 Points)  [ ] Positive family history for VTE                         (3 Points)             [ ] Acute spinal cord injury (in the previous month)  (5 Points)  [ ] Prothrombin 34864 A                                     (3 Points)               [ ] Paralysis  (less than 1 month)                             (5 Points)  [ ] Factor V Leiden                                             (3 Points)                  [ ] Multiple Trauma within 1 month                        (5 Points)  [ ] Lupus anticoagulants                                     (3 Points)                                                           [ ] Anticardiolipin antibodies                               (3 Points)                                                       [ ] High homocysteine in the blood                      (3 Points)                                             [ ] Other congenital or acquired thrombophilia      (3 Points)                                                [ ] Heparin induced thrombocytopenia                  (3 Points)                                     Total Score [          ]    OPIOID RISK TOOL    DIVYA EACH BOX THAT APPLIES AND ADD TOTALS AT THE END    FAMILY HISTORY OF SUBSTANCE ABUSE                 FEMALE         MALE                                                Alcohol                             [  ]1 pt          [  ]3pts                                               Illegal Drugs                     [  ]2 pts        [  ]3pts                                               Rx Drugs                           [  ]4 pts        [  ]4 pts    PERSONAL HISTORY OF SUBSTANCE ABUSE                                                                                          Alcohol                             [  ]3 pts       [  ]3 pts                                               Illegal Drugs                     [  ]4 pts        [  ]4 pts                                               Rx Drugs                           [  ]5 pts        [  ]5 pts    AGE BETWEEN 16-45 YEARS                                      [  ]1 pt         [  ]1 pt    HISTORY OF PREADOLESCENT   SEXUAL ABUSE                                                             [  ]3 pts        [  ]0pts    PSYCHOLOGICAL DISEASE                     ADD, OCD, Bipolar, Schizophrenia        [  ]2 pts         [  ]2 pts                      Depression                                               [  ]1 pt           [  ]1 pt           SCORING TOTAL   (add numbers and type here)              (***)                                     A score of 3 or lower indicated LOW risk for future opioid abuse  A score of 4 to 7 indicated moderate risk for future opioid abuse  A score of 8 or higher indicates a high risk for opioid abuse 51 year old male who states he has herniated disc and pain in his lower back and in his neck and had a MRI which showed a Chiari malformation, he is scheduled for a suboccipital craniectomy and duraplasty for decompression of Chiari malformation by Dr. Araiza on 10/20/21. Covid vaccine series completed, card in chart. J&J 21. covid  test is on 10/17/21. he continue to have neck and lower back pain. Medical and cardiac clearance pending         medications reviewed, instructions given on what medications to take and what not to take. Please take, Losartan, gabapentin with one sip of water. use Albuterol, and Anoro in the AM of surgery. Asked the pt not to take DM meds on the DOP. stop Mobic one week prior, all other meds can be continued till the day before surgery. Asked the pt not to take any NSAID's 5-7 days before surgery and told the pt Tylenol is okay to take for pain, pt verbalized understanding. pt is not taking ASA/Plavix/Anticoagulation medication at this time.     CAPRINI VTE 2.0 SCORE [CLOT updated 2019]    AGE RELATED RISK FACTORS                                                       MOBILITY RELATED FACTORS  [ x] Age 41-60 years                                            (1 Point)                    [ ] Bed rest                                                        (1 Point)  [ ] Age: 61-74 years                                           (2 Points)                  [ ] Plaster cast                                                   (2 Points)  [ ] Age= 75 years                                              (3 Points)                    [ ] Bed bound for more than 72 hours                 (2 Points)    DISEASE RELATED RISK FACTORS                                               GENDER SPECIFIC FACTORS  [x ] Edema in the lower extremities                       (1 Point)              [ ] Pregnancy                                                     (1 Point)  [ ] Varicose veins                                               (1 Point)                     [ ] Post-partum < 6 weeks                                   (1 Point)             [x ] BMI > 25 Kg/m2                                            (1 Point)                     [ ] Hormonal therapy  or oral contraception          (1 Point)                 [ ] Sepsis (in the previous month)                        (1 Point)               [ ] History of pregnancy complications                 (1 point)  [ ] Pneumonia or serious lung disease                                               [ ] Unexplained or recurrent                     (1 Point)           (in the previous month)                               (1 Point)  [ ] Abnormal pulmonary function test                     (1 Point)                 SURGERY RELATED RISK FACTORS  [ ] Acute myocardial infarction                              (1 Point)               [ ]  Section                                             (1 Point)  [ ] Congestive heart failure (in the previous month)  (1 Point)      [ ] Minor surgery                                                  (1 Point)   [ ] Inflammatory bowel disease                             (1 Point)               [ ] Arthroscopic surgery                                        (2 Points)  [ ] Central venous access                                      (2 Points)                [ x] General surgery lasting more than 45 minutes (2 points)  [ ] Malignancy- Present or previous                   (2 Points)                [ ] Elective arthroplasty                                         (5 points)    [ ] Stroke (in the previous month)                          (5 Points)                                                                                                                                                           HEMATOLOGY RELATED FACTORS                                                 TRAUMA RELATED RISK FACTORS  [ ] Prior episodes of VTE                                     (3 Points)                [ ] Fracture of the hip, pelvis, or leg                       (5 Points)  [ ] Positive family history for VTE                         (3 Points)             [ ] Acute spinal cord injury (in the previous month)  (5 Points)  [ ] Prothrombin 02036 A                                     (3 Points)               [ ] Paralysis  (less than 1 month)                             (5 Points)  [ ] Factor V Leiden                                             (3 Points)                  [ ] Multiple Trauma within 1 month                        (5 Points)  [ ] Lupus anticoagulants                                     (3 Points)                                                           [ ] Anticardiolipin antibodies                               (3 Points)                                                       [ ] High homocysteine in the blood                      (3 Points)                                             [ ] Other congenital or acquired thrombophilia      (3 Points)                                                [ ] Heparin induced thrombocytopenia                  (3 Points)                                     Total Score [   5       ]    OPIOID RISK TOOL    DIVYA EACH BOX THAT APPLIES AND ADD TOTALS AT THE END    FAMILY HISTORY OF SUBSTANCE ABUSE                 FEMALE         MALE                                                Alcohol                             [  ]1 pt          [  ]3pts                                               Illegal Drugs                     [  ]2 pts        [  ]3pts                                               Rx Drugs                           [  ]4 pts        [  ]4 pts    PERSONAL HISTORY OF SUBSTANCE ABUSE                                                                                          Alcohol                             [  ]3 pts       [ x]3 pts                                               Illegal Drugs                     [  ]4 pts        [ x ]4 pts                                               Rx Drugs                           [  ]5 pts        [  ]5 pts    AGE BETWEEN 16-45 YEARS                                      [  ]1 pt         [  ]1 pt    HISTORY OF PREADOLESCENT   SEXUAL ABUSE                                                             [  ]3 pts        [  ]0pts    PSYCHOLOGICAL DISEASE                     ADD, OCD, Bipolar, Schizophrenia        [  ]2 pts         [  ]2 pts                      Depression                                               [  ]1 pt           [  ]1 pt           SCORING TOTAL   (add numbers and type here)              ( 7)                                    A score of 3 or lower indicated LOW risk for future opioid abuse  A score of 4 to 7 indicated moderate risk for future opioid abuse  A score of 8 or higher indicates a high risk for opioid abuse 51 year old male who states he has herniated disc and pain in his lower back and in his neck and had a MRI which showed a Chiari malformation, he is scheduled for a suboccipital craniectomy and duraplasty for decompression of Chiari malformation by Dr. Araiza on 10/20/21. Covid vaccine series completed, card in chart. J&J 21. covid  test is on 10/17/21. he continue to have neck and lower back pain. Medical and cardiac clearance pending     patient has no cardiac history, but has SAAVEDRA and BMI is 45, has DM and HTN, hasn't seen a cardiologist ever, MPS 4 large neck, METS <4 cardiac clearance ordered.         medications reviewed, instructions given on what medications to take and what not to take. Please take, Losartan, gabapentin with one sip of water. use Albuterol, and Anoro in the AM of surgery. Asked the pt not to take DM meds on the DOP. stop Mobic one week prior, all other meds can be continued till the day before surgery. Asked the pt not to take any NSAID's 5-7 days before surgery and told the pt Tylenol is okay to take for pain, pt verbalized understanding. pt is not taking ASA/Plavix/Anticoagulation medication at this time.     CAPRINI VTE 2.0 SCORE [CLOT updated 2019]    AGE RELATED RISK FACTORS                                                       MOBILITY RELATED FACTORS  [ x] Age 41-60 years                                            (1 Point)                    [ ] Bed rest                                                        (1 Point)  [ ] Age: 61-74 years                                           (2 Points)                  [ ] Plaster cast                                                   (2 Points)  [ ] Age= 75 years                                              (3 Points)                    [ ] Bed bound for more than 72 hours                 (2 Points)    DISEASE RELATED RISK FACTORS                                               GENDER SPECIFIC FACTORS  [x ] Edema in the lower extremities                       (1 Point)              [ ] Pregnancy                                                     (1 Point)  [ ] Varicose veins                                               (1 Point)                     [ ] Post-partum < 6 weeks                                   (1 Point)             [x ] BMI > 25 Kg/m2                                            (1 Point)                     [ ] Hormonal therapy  or oral contraception          (1 Point)                 [ ] Sepsis (in the previous month)                        (1 Point)               [ ] History of pregnancy complications                 (1 point)  [ ] Pneumonia or serious lung disease                                               [ ] Unexplained or recurrent                     (1 Point)           (in the previous month)                               (1 Point)  [ ] Abnormal pulmonary function test                     (1 Point)                 SURGERY RELATED RISK FACTORS  [ ] Acute myocardial infarction                              (1 Point)               [ ]  Section                                             (1 Point)  [ ] Congestive heart failure (in the previous month)  (1 Point)      [ ] Minor surgery                                                  (1 Point)   [ ] Inflammatory bowel disease                             (1 Point)               [ ] Arthroscopic surgery                                        (2 Points)  [ ] Central venous access                                      (2 Points)                [ x] General surgery lasting more than 45 minutes (2 points)  [ ] Malignancy- Present or previous                   (2 Points)                [ ] Elective arthroplasty                                         (5 points)    [ ] Stroke (in the previous month)                          (5 Points)                                                                                                                                                           HEMATOLOGY RELATED FACTORS                                                 TRAUMA RELATED RISK FACTORS  [ ] Prior episodes of VTE                                     (3 Points)                [ ] Fracture of the hip, pelvis, or leg                       (5 Points)  [ ] Positive family history for VTE                         (3 Points)             [ ] Acute spinal cord injury (in the previous month)  (5 Points)  [ ] Prothrombin 32109 A                                     (3 Points)               [ ] Paralysis  (less than 1 month)                             (5 Points)  [ ] Factor V Leiden                                             (3 Points)                  [ ] Multiple Trauma within 1 month                        (5 Points)  [ ] Lupus anticoagulants                                     (3 Points)                                                           [ ] Anticardiolipin antibodies                               (3 Points)                                                       [ ] High homocysteine in the blood                      (3 Points)                                             [ ] Other congenital or acquired thrombophilia      (3 Points)                                                [ ] Heparin induced thrombocytopenia                  (3 Points)                                     Total Score [   5       ]    OPIOID RISK TOOL    DIVYA EACH BOX THAT APPLIES AND ADD TOTALS AT THE END    FAMILY HISTORY OF SUBSTANCE ABUSE                 FEMALE         MALE                                                Alcohol                             [  ]1 pt          [  ]3pts                                               Illegal Drugs                     [  ]2 pts        [  ]3pts                                               Rx Drugs                           [  ]4 pts        [  ]4 pts    PERSONAL HISTORY OF SUBSTANCE ABUSE                                                                                          Alcohol                             [  ]3 pts       [ x]3 pts                                               Illegal Drugs                     [  ]4 pts        [ x ]4 pts                                               Rx Drugs                           [  ]5 pts        [  ]5 pts    AGE BETWEEN 16-45 YEARS                                      [  ]1 pt         [  ]1 pt    HISTORY OF PREADOLESCENT   SEXUAL ABUSE                                                             [  ]3 pts        [  ]0pts    PSYCHOLOGICAL DISEASE                     ADD, OCD, Bipolar, Schizophrenia        [  ]2 pts         [  ]2 pts                      Depression                                               [  ]1 pt           [  ]1 pt           SCORING TOTAL   (add numbers and type here)              ( 7)                                    A score of 3 or lower indicated LOW risk for future opioid abuse  A score of 4 to 7 indicated moderate risk for future opioid abuse  A score of 8 or higher indicates a high risk for opioid abuse

## 2021-10-13 PROBLEM — J44.9 CHRONIC OBSTRUCTIVE PULMONARY DISEASE, UNSPECIFIED: Chronic | Status: ACTIVE | Noted: 2021-10-12

## 2021-10-13 PROBLEM — M51.26 OTHER INTERVERTEBRAL DISC DISPLACEMENT, LUMBAR REGION: Chronic | Status: ACTIVE | Noted: 2021-10-12

## 2021-10-13 PROBLEM — G47.33 OBSTRUCTIVE SLEEP APNEA (ADULT) (PEDIATRIC): Chronic | Status: ACTIVE | Noted: 2021-10-12

## 2021-10-13 PROBLEM — E78.5 HYPERLIPIDEMIA, UNSPECIFIED: Chronic | Status: ACTIVE | Noted: 2021-10-12

## 2021-10-13 PROBLEM — I10 ESSENTIAL (PRIMARY) HYPERTENSION: Chronic | Status: ACTIVE | Noted: 2021-10-12

## 2021-10-13 PROBLEM — E11.9 TYPE 2 DIABETES MELLITUS WITHOUT COMPLICATIONS: Chronic | Status: ACTIVE | Noted: 2021-10-12

## 2021-10-13 LAB
MRSA PCR RESULT.: SIGNIFICANT CHANGE UP
S AUREUS DNA NOSE QL NAA+PROBE: SIGNIFICANT CHANGE UP

## 2021-10-14 ENCOUNTER — OUTPATIENT (OUTPATIENT)
Dept: OUTPATIENT SERVICES | Facility: HOSPITAL | Age: 51
LOS: 1 days | End: 2021-10-14
Payer: MEDICAID

## 2021-10-14 DIAGNOSIS — Z01.812 ENCOUNTER FOR PREPROCEDURAL LABORATORY EXAMINATION: ICD-10-CM

## 2021-10-14 DIAGNOSIS — Z90.49 ACQUIRED ABSENCE OF OTHER SPECIFIED PARTS OF DIGESTIVE TRACT: Chronic | ICD-10-CM

## 2021-10-14 PROCEDURE — 71046 X-RAY EXAM CHEST 2 VIEWS: CPT | Mod: 26

## 2021-10-14 PROCEDURE — 71046 X-RAY EXAM CHEST 2 VIEWS: CPT

## 2021-10-15 ENCOUNTER — APPOINTMENT (OUTPATIENT)
Dept: CARDIOLOGY | Facility: CLINIC | Age: 51
End: 2021-10-15
Payer: MEDICAID

## 2021-10-15 VITALS
BODY MASS INDEX: 43.59 KG/M2 | WEIGHT: 246 LBS | OXYGEN SATURATION: 95 % | TEMPERATURE: 97 F | DIASTOLIC BLOOD PRESSURE: 80 MMHG | SYSTOLIC BLOOD PRESSURE: 122 MMHG | HEIGHT: 63 IN | HEART RATE: 100 BPM

## 2021-10-15 DIAGNOSIS — Z78.9 OTHER SPECIFIED HEALTH STATUS: ICD-10-CM

## 2021-10-15 DIAGNOSIS — Z01.810 ENCOUNTER FOR PREPROCEDURAL CARDIOVASCULAR EXAMINATION: ICD-10-CM

## 2021-10-15 DIAGNOSIS — Z01.818 ENCOUNTER FOR OTHER PREPROCEDURAL EXAMINATION: ICD-10-CM

## 2021-10-15 DIAGNOSIS — R73.03 PREDIABETES.: ICD-10-CM

## 2021-10-15 DIAGNOSIS — F10.21 ALCOHOL DEPENDENCE, IN REMISSION: ICD-10-CM

## 2021-10-15 PROCEDURE — 99204 OFFICE O/P NEW MOD 45 MIN: CPT

## 2021-10-15 RX ORDER — CYCLOBENZAPRINE HCL 10 MG
10 TABLET ORAL
Refills: 0 | Status: DISCONTINUED | COMMUNITY
End: 2021-10-15

## 2021-10-15 RX ORDER — TRAMADOL HYDROCHLORIDE 50 MG/1
50 TABLET, COATED ORAL
Qty: 28 | Refills: 0 | Status: DISCONTINUED | COMMUNITY
Start: 2021-10-14 | End: 2021-10-15

## 2021-10-15 RX ORDER — OFLOXACIN OTIC 3 MG/ML
0.3 SOLUTION AURICULAR (OTIC)
Qty: 1 | Refills: 0 | Status: DISCONTINUED | COMMUNITY
Start: 2019-10-16 | End: 2021-10-15

## 2021-10-15 RX ORDER — METFORMIN HYDROCHLORIDE 500 MG/1
500 TABLET, COATED ORAL TWICE DAILY
Qty: 180 | Refills: 0 | Status: ACTIVE | COMMUNITY

## 2021-10-15 RX ORDER — LOSARTAN POTASSIUM 100 MG/1
TABLET, FILM COATED ORAL
Refills: 0 | Status: DISCONTINUED | COMMUNITY
End: 2021-10-15

## 2021-10-15 RX ORDER — TRAMADOL HYDROCHLORIDE 50 MG/1
50 TABLET, COATED ORAL 3 TIMES DAILY
Qty: 36 | Refills: 0 | Status: DISCONTINUED | COMMUNITY
Start: 2021-09-10 | End: 2021-10-15

## 2021-10-15 RX ORDER — PREDNISONE 50 MG/1
TABLET ORAL
Refills: 0 | Status: DISCONTINUED | COMMUNITY
End: 2021-10-15

## 2021-10-15 RX ORDER — DIPHENHYDRAMINE HYDROCHLORIDE AND LIDOCAINE HYDROCHLORIDE AND ALUMINUM HYDROXIDE AND MAGNESIUM HYDRO
KIT
Qty: 119 | Refills: 0 | Status: DISCONTINUED | COMMUNITY
Start: 2021-07-01 | End: 2021-10-15

## 2021-10-15 RX ORDER — LOSARTAN POTASSIUM 100 MG/1
100 TABLET, FILM COATED ORAL DAILY
Qty: 90 | Refills: 0 | Status: ACTIVE | COMMUNITY

## 2021-10-15 RX ORDER — ARIPIPRAZOLE 1 MG/ML
1 SOLUTION ORAL
Refills: 0 | Status: DISCONTINUED | COMMUNITY
End: 2021-10-15

## 2021-10-15 RX ORDER — TRAMADOL HYDROCHLORIDE 50 MG/1
50 TABLET, COATED ORAL
Qty: 45 | Refills: 0 | Status: DISCONTINUED | COMMUNITY
Start: 2021-07-29 | End: 2021-10-15

## 2021-10-15 RX ORDER — PREDNISONE 10 MG/1
10 TABLET ORAL
Qty: 30 | Refills: 0 | Status: DISCONTINUED | COMMUNITY
Start: 2019-07-17 | End: 2021-10-15

## 2021-10-17 ENCOUNTER — APPOINTMENT (OUTPATIENT)
Dept: DISASTER EMERGENCY | Facility: CLINIC | Age: 51
End: 2021-10-17

## 2021-10-18 ENCOUNTER — APPOINTMENT (OUTPATIENT)
Dept: CARDIOLOGY | Facility: CLINIC | Age: 51
End: 2021-10-18
Payer: MEDICAID

## 2021-10-18 LAB — SARS-COV-2 N GENE NPH QL NAA+PROBE: NOT DETECTED

## 2021-10-18 PROCEDURE — 93306 TTE W/DOPPLER COMPLETE: CPT

## 2021-10-19 ENCOUNTER — TRANSCRIPTION ENCOUNTER (OUTPATIENT)
Age: 51
End: 2021-10-19

## 2021-10-19 NOTE — PHYSICAL EXAM
[Normal] : moves all extremities, no focal deficits, normal speech [de-identified] : ambulates slowly with cane.

## 2021-10-19 NOTE — HISTORY OF PRESENT ILLNESS
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 The surgical date is 10/20/2021\par \par There is no history of MI, CVA, CHF, or previous coronary intervention.\par  \par Patient does have chronic mild dyspnea on exertion, however he attributes this to his smoking and obesity. Not very active due to his neurological condition. There is no exertional chest pain or pressure. No palpitations.

## 2021-10-19 NOTE — ADDENDUM
[FreeTextEntry1] : Patient underwent echocardiogram on 10/18/2021, which revealed LV EF 60% with no significant valvular disease. \par \par Patient may proceed with planned surgical procedure from a cardiovascular standpoint.

## 2021-10-19 NOTE — REVIEW OF SYSTEMS
[Joint Pain] : joint pain [Joint Stiffness] : joint stiffness [Negative] : Gastrointestinal [FreeTextEntry5] : see HPI [FreeTextEntry6] : see HPI [de-identified] : see HPI

## 2021-10-19 NOTE — DISCUSSION/SUMMARY
[FreeTextEntry1] : 1. HTN: appears controlled. Continue losartan 100mg daily. Goal BP less than 130/80.\par \par 2. HLD: continue atorvastatin 20mg daily.\par \par I am recommending an echocardiogram prior to surgery to establish LV EF, valvular disease and potentially estimate pulmonary pressures.  \par \par Once echocardiogram reviewed I can make further recommendations regarding the perioperative care of this patient.

## 2021-10-20 ENCOUNTER — INPATIENT (INPATIENT)
Facility: HOSPITAL | Age: 51
LOS: 3 days | Discharge: HOME | DRG: 26 | End: 2021-10-24
Attending: NEUROLOGICAL SURGERY | Admitting: NEUROLOGICAL SURGERY
Payer: MEDICAID

## 2021-10-20 ENCOUNTER — APPOINTMENT (OUTPATIENT)
Dept: NEUROSURGERY | Facility: HOSPITAL | Age: 51
End: 2021-10-20
Payer: MEDICAID

## 2021-10-20 VITALS
HEIGHT: 63 IN | RESPIRATION RATE: 16 BRPM | TEMPERATURE: 97 F | HEART RATE: 106 BPM | WEIGHT: 246.92 LBS | OXYGEN SATURATION: 100 % | DIASTOLIC BLOOD PRESSURE: 72 MMHG | SYSTOLIC BLOOD PRESSURE: 128 MMHG

## 2021-10-20 DIAGNOSIS — Z90.49 ACQUIRED ABSENCE OF OTHER SPECIFIED PARTS OF DIGESTIVE TRACT: Chronic | ICD-10-CM

## 2021-10-20 DIAGNOSIS — G93.5 COMPRESSION OF BRAIN: ICD-10-CM

## 2021-10-20 LAB
ABO RH CONFIRMATION: SIGNIFICANT CHANGE UP
ANION GAP SERPL CALC-SCNC: 15 MMOL/L — SIGNIFICANT CHANGE UP (ref 5–17)
APPEARANCE UR: CLEAR — SIGNIFICANT CHANGE UP
BACTERIA # UR AUTO: ABNORMAL
BILIRUB UR-MCNC: NEGATIVE — SIGNIFICANT CHANGE UP
BUN SERPL-MCNC: 14.8 MG/DL — SIGNIFICANT CHANGE UP (ref 8–20)
CALCIUM SERPL-MCNC: 8.6 MG/DL — SIGNIFICANT CHANGE UP (ref 8.6–10.2)
CHLORIDE SERPL-SCNC: 99 MMOL/L — SIGNIFICANT CHANGE UP (ref 98–107)
CO2 SERPL-SCNC: 21 MMOL/L — LOW (ref 22–29)
COLOR SPEC: YELLOW — SIGNIFICANT CHANGE UP
CREAT SERPL-MCNC: 0.99 MG/DL — SIGNIFICANT CHANGE UP (ref 0.5–1.3)
DIFF PNL FLD: ABNORMAL
EPI CELLS # UR: SIGNIFICANT CHANGE UP
GLUCOSE BLDC GLUCOMTR-MCNC: 169 MG/DL — HIGH (ref 70–99)
GLUCOSE BLDC GLUCOMTR-MCNC: 208 MG/DL — HIGH (ref 70–99)
GLUCOSE SERPL-MCNC: 245 MG/DL — HIGH (ref 70–99)
GLUCOSE UR QL: 1000 MG/DL
HCT VFR BLD CALC: 50.1 % — HIGH (ref 39–50)
HGB BLD-MCNC: 16.4 G/DL — SIGNIFICANT CHANGE UP (ref 13–17)
KETONES UR-MCNC: NEGATIVE — SIGNIFICANT CHANGE UP
LEUKOCYTE ESTERASE UR-ACNC: NEGATIVE — SIGNIFICANT CHANGE UP
MAGNESIUM SERPL-MCNC: 1.9 MG/DL — SIGNIFICANT CHANGE UP (ref 1.6–2.6)
MCHC RBC-ENTMCNC: 32.5 PG — SIGNIFICANT CHANGE UP (ref 27–34)
MCHC RBC-ENTMCNC: 32.7 GM/DL — SIGNIFICANT CHANGE UP (ref 32–36)
MCV RBC AUTO: 99.4 FL — SIGNIFICANT CHANGE UP (ref 80–100)
NITRITE UR-MCNC: NEGATIVE — SIGNIFICANT CHANGE UP
PH UR: 6 — SIGNIFICANT CHANGE UP (ref 5–8)
PHOSPHATE SERPL-MCNC: 3.5 MG/DL — SIGNIFICANT CHANGE UP (ref 2.4–4.7)
PLATELET # BLD AUTO: 370 K/UL — SIGNIFICANT CHANGE UP (ref 150–400)
POTASSIUM SERPL-MCNC: 4.7 MMOL/L — SIGNIFICANT CHANGE UP (ref 3.5–5.3)
POTASSIUM SERPL-SCNC: 4.7 MMOL/L — SIGNIFICANT CHANGE UP (ref 3.5–5.3)
PROT UR-MCNC: 30 MG/DL
RBC # BLD: 5.04 M/UL — SIGNIFICANT CHANGE UP (ref 4.2–5.8)
RBC # FLD: 12.3 % — SIGNIFICANT CHANGE UP (ref 10.3–14.5)
RBC CASTS # UR COMP ASSIST: ABNORMAL /HPF (ref 0–4)
SODIUM SERPL-SCNC: 135 MMOL/L — SIGNIFICANT CHANGE UP (ref 135–145)
SP GR SPEC: 1.01 — SIGNIFICANT CHANGE UP (ref 1.01–1.02)
UROBILINOGEN FLD QL: NEGATIVE MG/DL — SIGNIFICANT CHANGE UP
WBC # BLD: 19.97 K/UL — HIGH (ref 3.8–10.5)
WBC # FLD AUTO: 19.97 K/UL — HIGH (ref 3.8–10.5)
WBC UR QL: SIGNIFICANT CHANGE UP

## 2021-10-20 PROCEDURE — 61343 CRNEC SOPL CRV LAM DCMPRN: CPT

## 2021-10-20 PROCEDURE — 61343 CRNEC SOPL CRV LAM DCMPRN: CPT | Mod: 82

## 2021-10-20 PROCEDURE — 70450 CT HEAD/BRAIN W/O DYE: CPT | Mod: 26

## 2021-10-20 PROCEDURE — 99221 1ST HOSP IP/OBS SF/LOW 40: CPT

## 2021-10-20 RX ORDER — LABETALOL HCL 100 MG
10 TABLET ORAL EVERY 4 HOURS
Refills: 0 | Status: DISCONTINUED | OUTPATIENT
Start: 2021-10-20 | End: 2021-10-23

## 2021-10-20 RX ORDER — ALBUTEROL 90 UG/1
2 AEROSOL, METERED ORAL
Qty: 0 | Refills: 0 | DISCHARGE

## 2021-10-20 RX ORDER — HYDRALAZINE HCL 50 MG
10 TABLET ORAL EVERY 4 HOURS
Refills: 0 | Status: DISCONTINUED | OUTPATIENT
Start: 2021-10-20 | End: 2021-10-20

## 2021-10-20 RX ORDER — HYDRALAZINE HCL 50 MG
10 TABLET ORAL EVERY 4 HOURS
Refills: 0 | Status: DISCONTINUED | OUTPATIENT
Start: 2021-10-20 | End: 2021-10-23

## 2021-10-20 RX ORDER — GABAPENTIN 400 MG/1
400 CAPSULE ORAL THREE TIMES A DAY
Refills: 0 | Status: DISCONTINUED | OUTPATIENT
Start: 2021-10-20 | End: 2021-10-24

## 2021-10-20 RX ORDER — METHOCARBAMOL 500 MG/1
750 TABLET, FILM COATED ORAL EVERY 8 HOURS
Refills: 0 | Status: DISCONTINUED | OUTPATIENT
Start: 2021-10-20 | End: 2021-10-22

## 2021-10-20 RX ORDER — SODIUM CHLORIDE 9 MG/ML
1000 INJECTION INTRAMUSCULAR; INTRAVENOUS; SUBCUTANEOUS
Refills: 0 | Status: DISCONTINUED | OUTPATIENT
Start: 2021-10-20 | End: 2021-10-21

## 2021-10-20 RX ORDER — FENTANYL CITRATE 50 UG/ML
25 INJECTION INTRAVENOUS ONCE
Refills: 0 | Status: DISCONTINUED | OUTPATIENT
Start: 2021-10-20 | End: 2021-10-20

## 2021-10-20 RX ORDER — LABETALOL HCL 100 MG
10 TABLET ORAL EVERY 4 HOURS
Refills: 0 | Status: DISCONTINUED | OUTPATIENT
Start: 2021-10-20 | End: 2021-10-20

## 2021-10-20 RX ORDER — INSULIN LISPRO 100/ML
VIAL (ML) SUBCUTANEOUS
Refills: 0 | Status: DISCONTINUED | OUTPATIENT
Start: 2021-10-20 | End: 2021-10-24

## 2021-10-20 RX ORDER — TIOTROPIUM BROMIDE 18 UG/1
1 CAPSULE ORAL; RESPIRATORY (INHALATION) DAILY
Refills: 0 | Status: DISCONTINUED | OUTPATIENT
Start: 2021-10-20 | End: 2021-10-24

## 2021-10-20 RX ORDER — ALBUTEROL 90 UG/1
2 AEROSOL, METERED ORAL EVERY 6 HOURS
Refills: 0 | Status: DISCONTINUED | OUTPATIENT
Start: 2021-10-20 | End: 2021-10-24

## 2021-10-20 RX ORDER — DEXTROSE 50 % IN WATER 50 %
25 SYRINGE (ML) INTRAVENOUS ONCE
Refills: 0 | Status: DISCONTINUED | OUTPATIENT
Start: 2021-10-20 | End: 2021-10-24

## 2021-10-20 RX ORDER — SODIUM CHLORIDE 9 MG/ML
1000 INJECTION, SOLUTION INTRAVENOUS
Refills: 0 | Status: DISCONTINUED | OUTPATIENT
Start: 2021-10-20 | End: 2021-10-24

## 2021-10-20 RX ORDER — ATORVASTATIN CALCIUM 80 MG/1
1 TABLET, FILM COATED ORAL
Qty: 0 | Refills: 0 | DISCHARGE

## 2021-10-20 RX ORDER — VANCOMYCIN HCL 1 G
1750 VIAL (EA) INTRAVENOUS ONCE
Refills: 0 | Status: COMPLETED | OUTPATIENT
Start: 2021-10-20 | End: 2021-10-20

## 2021-10-20 RX ORDER — OXYCODONE HYDROCHLORIDE 5 MG/1
10 TABLET ORAL EVERY 4 HOURS
Refills: 0 | Status: DISCONTINUED | OUTPATIENT
Start: 2021-10-20 | End: 2021-10-21

## 2021-10-20 RX ORDER — OXYCODONE HYDROCHLORIDE 5 MG/1
5 TABLET ORAL EVERY 4 HOURS
Refills: 0 | Status: DISCONTINUED | OUTPATIENT
Start: 2021-10-20 | End: 2021-10-21

## 2021-10-20 RX ORDER — SENNA PLUS 8.6 MG/1
2 TABLET ORAL AT BEDTIME
Refills: 0 | Status: DISCONTINUED | OUTPATIENT
Start: 2021-10-20 | End: 2021-10-21

## 2021-10-20 RX ORDER — HYDROMORPHONE HYDROCHLORIDE 2 MG/ML
0.5 INJECTION INTRAMUSCULAR; INTRAVENOUS; SUBCUTANEOUS EVERY 4 HOURS
Refills: 0 | Status: DISCONTINUED | OUTPATIENT
Start: 2021-10-20 | End: 2021-10-23

## 2021-10-20 RX ORDER — CEFTRIAXONE 500 MG/1
1000 INJECTION, POWDER, FOR SOLUTION INTRAMUSCULAR; INTRAVENOUS EVERY 24 HOURS
Refills: 0 | Status: DISCONTINUED | OUTPATIENT
Start: 2021-10-20 | End: 2021-10-21

## 2021-10-20 RX ORDER — ATORVASTATIN CALCIUM 80 MG/1
20 TABLET, FILM COATED ORAL AT BEDTIME
Refills: 0 | Status: DISCONTINUED | OUTPATIENT
Start: 2021-10-20 | End: 2021-10-24

## 2021-10-20 RX ORDER — GLUCAGON INJECTION, SOLUTION 0.5 MG/.1ML
1 INJECTION, SOLUTION SUBCUTANEOUS ONCE
Refills: 0 | Status: DISCONTINUED | OUTPATIENT
Start: 2021-10-20 | End: 2021-10-24

## 2021-10-20 RX ORDER — CYCLOBENZAPRINE HYDROCHLORIDE 10 MG/1
10 TABLET, FILM COATED ORAL THREE TIMES A DAY
Refills: 0 | Status: DISCONTINUED | OUTPATIENT
Start: 2021-10-20 | End: 2021-10-20

## 2021-10-20 RX ORDER — SODIUM CHLORIDE 9 MG/ML
3 INJECTION INTRAMUSCULAR; INTRAVENOUS; SUBCUTANEOUS EVERY 8 HOURS
Refills: 0 | Status: DISCONTINUED | OUTPATIENT
Start: 2021-10-20 | End: 2021-10-20

## 2021-10-20 RX ORDER — DEXTROSE 50 % IN WATER 50 %
15 SYRINGE (ML) INTRAVENOUS ONCE
Refills: 0 | Status: DISCONTINUED | OUTPATIENT
Start: 2021-10-20 | End: 2021-10-24

## 2021-10-20 RX ORDER — ONDANSETRON 8 MG/1
4 TABLET, FILM COATED ORAL EVERY 6 HOURS
Refills: 0 | Status: DISCONTINUED | OUTPATIENT
Start: 2021-10-20 | End: 2021-10-24

## 2021-10-20 RX ORDER — UMECLIDINIUM BROMIDE AND VILANTEROL TRIFENATATE 62.5; 25 UG/1; UG/1
1 POWDER RESPIRATORY (INHALATION)
Qty: 0 | Refills: 0 | DISCHARGE

## 2021-10-20 RX ORDER — SODIUM CHLORIDE 9 MG/ML
500 INJECTION INTRAMUSCULAR; INTRAVENOUS; SUBCUTANEOUS ONCE
Refills: 0 | Status: COMPLETED | OUTPATIENT
Start: 2021-10-20 | End: 2021-10-20

## 2021-10-20 RX ORDER — GABAPENTIN 400 MG/1
1 CAPSULE ORAL
Qty: 0 | Refills: 0 | DISCHARGE

## 2021-10-20 RX ORDER — ACETAMINOPHEN 500 MG
650 TABLET ORAL EVERY 6 HOURS
Refills: 0 | Status: DISCONTINUED | OUTPATIENT
Start: 2021-10-20 | End: 2021-10-24

## 2021-10-20 RX ORDER — DEXTROSE 50 % IN WATER 50 %
12.5 SYRINGE (ML) INTRAVENOUS ONCE
Refills: 0 | Status: DISCONTINUED | OUTPATIENT
Start: 2021-10-20 | End: 2021-10-24

## 2021-10-20 RX ORDER — ACETAMINOPHEN 500 MG
975 TABLET ORAL ONCE
Refills: 0 | Status: COMPLETED | OUTPATIENT
Start: 2021-10-20 | End: 2021-10-20

## 2021-10-20 RX ORDER — METFORMIN HYDROCHLORIDE 850 MG/1
1 TABLET ORAL
Qty: 0 | Refills: 0 | DISCHARGE

## 2021-10-20 RX ORDER — LOSARTAN POTASSIUM 100 MG/1
1 TABLET, FILM COATED ORAL
Qty: 0 | Refills: 0 | DISCHARGE

## 2021-10-20 RX ORDER — LOSARTAN POTASSIUM 100 MG/1
100 TABLET, FILM COATED ORAL DAILY
Refills: 0 | Status: DISCONTINUED | OUTPATIENT
Start: 2021-10-20 | End: 2021-10-24

## 2021-10-20 RX ADMIN — OXYCODONE HYDROCHLORIDE 10 MILLIGRAM(S): 5 TABLET ORAL at 12:59

## 2021-10-20 RX ADMIN — Medication 10 MILLIGRAM(S): at 13:50

## 2021-10-20 RX ADMIN — HYDROMORPHONE HYDROCHLORIDE 0.5 MILLIGRAM(S): 2 INJECTION INTRAMUSCULAR; INTRAVENOUS; SUBCUTANEOUS at 21:00

## 2021-10-20 RX ADMIN — METHOCARBAMOL 750 MILLIGRAM(S): 500 TABLET, FILM COATED ORAL at 15:23

## 2021-10-20 RX ADMIN — LOSARTAN POTASSIUM 100 MILLIGRAM(S): 100 TABLET, FILM COATED ORAL at 15:12

## 2021-10-20 RX ADMIN — Medication 250 MILLIGRAM(S): at 07:07

## 2021-10-20 RX ADMIN — GABAPENTIN 400 MILLIGRAM(S): 400 CAPSULE ORAL at 21:06

## 2021-10-20 RX ADMIN — Medication 1 TABLET(S): at 21:07

## 2021-10-20 RX ADMIN — OXYCODONE HYDROCHLORIDE 10 MILLIGRAM(S): 5 TABLET ORAL at 20:20

## 2021-10-20 RX ADMIN — CEFTRIAXONE 100 MILLIGRAM(S): 500 INJECTION, POWDER, FOR SOLUTION INTRAMUSCULAR; INTRAVENOUS at 15:16

## 2021-10-20 RX ADMIN — FENTANYL CITRATE 25 MICROGRAM(S): 50 INJECTION INTRAVENOUS at 12:52

## 2021-10-20 RX ADMIN — Medication 975 MILLIGRAM(S): at 07:06

## 2021-10-20 RX ADMIN — OXYCODONE HYDROCHLORIDE 10 MILLIGRAM(S): 5 TABLET ORAL at 19:29

## 2021-10-20 RX ADMIN — Medication 975 MILLIGRAM(S): at 07:36

## 2021-10-20 RX ADMIN — GABAPENTIN 400 MILLIGRAM(S): 400 CAPSULE ORAL at 15:16

## 2021-10-20 RX ADMIN — Medication 4: at 21:09

## 2021-10-20 RX ADMIN — OXYCODONE HYDROCHLORIDE 10 MILLIGRAM(S): 5 TABLET ORAL at 14:00

## 2021-10-20 RX ADMIN — ATORVASTATIN CALCIUM 20 MILLIGRAM(S): 80 TABLET, FILM COATED ORAL at 21:06

## 2021-10-20 RX ADMIN — HYDROMORPHONE HYDROCHLORIDE 0.5 MILLIGRAM(S): 2 INJECTION INTRAMUSCULAR; INTRAVENOUS; SUBCUTANEOUS at 22:00

## 2021-10-20 RX ADMIN — Medication 10 MILLIGRAM(S): at 13:36

## 2021-10-20 RX ADMIN — FENTANYL CITRATE 25 MICROGRAM(S): 50 INJECTION INTRAVENOUS at 12:38

## 2021-10-20 RX ADMIN — SODIUM CHLORIDE 500 MILLILITER(S): 9 INJECTION INTRAMUSCULAR; INTRAVENOUS; SUBCUTANEOUS at 23:40

## 2021-10-20 RX ADMIN — METHOCARBAMOL 750 MILLIGRAM(S): 500 TABLET, FILM COATED ORAL at 21:07

## 2021-10-20 NOTE — PROGRESS NOTE ADULT - SUBJECTIVE AND OBJECTIVE BOX
NSGY Attg:    Patient seen and examined.  No acute changes since last office visit.  Persistent hand weakness.  Patient ambulates with a cane.    Exam:  A and O x 3  PERRL  EOMI  WILKES to command  Hg 4/5 bilaterally  5/5 otherwise with encouragement    The patient verbalizes his understanding of the procedure and is declining re-explanation of the risks, benefits, and alternatives.  Cervical x-ray performed as an outpatient without evidence of dynamic instability.  The patient wishes to proceed with suboccipital craniectomy, C1/2 laminectomy, and duraplasty without occipital-cervical fusion as previously discussed and documented in the outpatient note.  I have answered all his questions as well as those of his wife.    A/P:  - to OR for posterior fossa decompression, C1/2 laminectomy, duraplasty

## 2021-10-20 NOTE — PROGRESS NOTE ADULT - SUBJECTIVE AND OBJECTIVE BOX
POST-OPERATIVE NOTE    Procedure: Suboccipital craniectomy for Chiari malformation     Diagnosis/Indication: Chiari malformation    Surgeon: Dr. Nash Araiza    INTERVAL HPI/ACUTE EVENTS:  52 y/o male w/ Hx of COPD, MARCUS cpap use, DM, M, HLD, HTN, admitted for elective suboccipital craniectomy and c1/c2 laminectomy for Chiari malformation with Dr. Araiza. He has history of neck pain and b/l hand weakness. He had outpatient MRI which confirmed Chiari malformation. Patient is admitted to the NSICU POD #0 for suboccipital craniectomy, C1-C2 laminectomy. Pt extubated post operatively, admitted to NSICU.     VITALS:  T(C): 36.7 (10-20-21 @ 18:07), Max: 36.7 (10-20-21 @ 18:07)  HR: 118 (10-20-21 @ 20:00) (94 - 118)  BP: 143/98 (10-20-21 @ 20:00) (119/86 - 152/96)  RR: 18 (10-20-21 @ 20:00) (11 - 20)  SpO2: 92% (10-20-21 @ 20:00) (92% - 100%)  Wt(kg): --    PHYSICAL EXAM:  GENERAL: NAD, well-groomed, well-developed  HEAD:  S/p suboccipital crani. Dressing clean, dry, intact.  DRAINS: Subgaleal drain to bulb suction. serosanguinous drainage noted.  NECK: Dressing clean, dry, intact  WOUND: Dressing clean dry intact  COLLETTE COMA SCORE: E-4 V-5 M-6 =16       E: 4= opens eyes spontaneously 3= to voice 2= to noxious 1= no opening       V: 5= oriented 4= confused 3= inappropriate words 2= incomprehensible sounds 1= nonverbal 1T= intubated       M: 6= follows commands 5= localizes 4= withdraws 3= flexor posturing 2= extensor posturing 1= no movement  MENTAL STATUS: AAO x3; Awake; Opens eyes spontaneously; Appropriately conversant without aphasia; following simple commands  CRANIAL NERVES: Visual acuity normal for age, visual fields full to confrontation, PERRL. EOMI without nystagmus. Facial sensation intact V1-3 distribution b/l. Face symmetric w/ normal eye closure and smile, tongue midline. Hearing grossly intact. Speech clear   REFLEXES: ALFONSO.  MOTOR: strength 5/5 b/l upper and lower extremities  Uppers     Delt (C5/6)     Bicep (C5/6)     Wrist Extend (C6)     Tricep (C7)     HG (C8/T1)  R                     5/5                 5/5                         5/5                           5/5                   5/5  L                      5/5                 5/5                         5/5                           5/5                   5/5  Lowers      HF(L1/L2)     KE (L3)     DF (L4)     EHL (L5)     PF (S1)      R                     5/5              5/5           5/5           5/5            5/5  L                     5/5               5/5          5/5            5/5            5/5  SENSATION: grossly intact to light touch all extremities  CHEST/LUNG: +breath sounds bilaterally; no rales, rhonchi, wheezing, appreciated   HEART: +S1/+S2; Regular rate and rhythm; no murmurs, rubs  ABDOMEN: Soft, nontender, nondistended; bowel sounds present   EXTREMITIES:  no clubbing, cyanosis  SKIN: Warm, dry    LABS:                        16.4   19.97 )-----------( 370      ( 20 Oct 2021 20:02 )             50.1       RADIOLOGY/OTHER:    CAPRINI SCORE [CLOT]:  Patient has an estimated Caprini score of greater than 5.  However, the patient's unique clinical situation will be addressed in an individual manner to determine appropriate anticoagulation treatment, if any.

## 2021-10-20 NOTE — BRIEF OPERATIVE NOTE - NSICDXBRIEFPOSTOP_GEN_ALL_CORE_FT
POST-OP DIAGNOSIS:  Chiari I malformation 20-Oct-2021 13:33:37  Mehdi Bradshaw  

## 2021-10-20 NOTE — BRIEF OPERATIVE NOTE - NSICDXBRIEFPROCEDURE_GEN_ALL_CORE_FT
PROCEDURES:  Suboccipital craniectomy for Chiari malformation 20-Oct-2021 13:33:03  Mehdi Bradshaw  

## 2021-10-20 NOTE — BRIEF OPERATIVE NOTE - OPERATION/FINDINGS
excellent bony decompression; + tonsillar pulsation noted on intraoperative US
Chiari Malformation
Chiari malformation

## 2021-10-20 NOTE — PROGRESS NOTE ADULT - ASSESSMENT
A/P: 50 y/o male w/ Hx of COPD, MARCUS cpap use, DM, M, HLD, HTN, admitted for elective suboccipital craniectomy and c1/c2 laminectomy for Chiari malformation with Dr. Araiza. He has history of neck pain and b/l hand weakness. He had outpatient MRI which confirmed Chiari malformation. Patient is admitted to the NSICU POD #0 for suboccipital craniectomy, C1-C2 laminectomy. Pt extubated post operatively, admitted to NSICU.      -Pt transferred to NSICU post operatively  -CT brain 6 hours post operatively  -STAT CT brain in neuro exam    -JUJU to full suction  -continue antibiotic coverage for 2 days  -pain control as needed, avoid over sedation  -maintain systolic  to 150   -Q1 hr neuro checks   -continue to follow

## 2021-10-21 LAB
ANION GAP SERPL CALC-SCNC: 12 MMOL/L — SIGNIFICANT CHANGE UP (ref 5–17)
BUN SERPL-MCNC: 13.7 MG/DL — SIGNIFICANT CHANGE UP (ref 8–20)
CALCIUM SERPL-MCNC: 8.5 MG/DL — LOW (ref 8.6–10.2)
CHLORIDE SERPL-SCNC: 103 MMOL/L — SIGNIFICANT CHANGE UP (ref 98–107)
CO2 SERPL-SCNC: 23 MMOL/L — SIGNIFICANT CHANGE UP (ref 22–29)
COVID-19 SPIKE DOMAIN AB INTERP: POSITIVE
COVID-19 SPIKE DOMAIN ANTIBODY RESULT: 10.6 U/ML — HIGH
CREAT SERPL-MCNC: 0.74 MG/DL — SIGNIFICANT CHANGE UP (ref 0.5–1.3)
GLUCOSE BLDC GLUCOMTR-MCNC: 154 MG/DL — HIGH (ref 70–99)
GLUCOSE BLDC GLUCOMTR-MCNC: 155 MG/DL — HIGH (ref 70–99)
GLUCOSE BLDC GLUCOMTR-MCNC: 176 MG/DL — HIGH (ref 70–99)
GLUCOSE BLDC GLUCOMTR-MCNC: 183 MG/DL — HIGH (ref 70–99)
GLUCOSE BLDC GLUCOMTR-MCNC: 186 MG/DL — HIGH (ref 70–99)
GLUCOSE SERPL-MCNC: 179 MG/DL — HIGH (ref 70–99)
HCT VFR BLD CALC: 47.5 % — SIGNIFICANT CHANGE UP (ref 39–50)
HGB BLD-MCNC: 15.3 G/DL — SIGNIFICANT CHANGE UP (ref 13–17)
MAGNESIUM SERPL-MCNC: 2 MG/DL — SIGNIFICANT CHANGE UP (ref 1.6–2.6)
MCHC RBC-ENTMCNC: 32 PG — SIGNIFICANT CHANGE UP (ref 27–34)
MCHC RBC-ENTMCNC: 32.2 GM/DL — SIGNIFICANT CHANGE UP (ref 32–36)
MCV RBC AUTO: 99.4 FL — SIGNIFICANT CHANGE UP (ref 80–100)
PHOSPHATE SERPL-MCNC: 4 MG/DL — SIGNIFICANT CHANGE UP (ref 2.4–4.7)
PLATELET # BLD AUTO: 366 K/UL — SIGNIFICANT CHANGE UP (ref 150–400)
POTASSIUM SERPL-MCNC: 4.3 MMOL/L — SIGNIFICANT CHANGE UP (ref 3.5–5.3)
POTASSIUM SERPL-SCNC: 4.3 MMOL/L — SIGNIFICANT CHANGE UP (ref 3.5–5.3)
RBC # BLD: 4.78 M/UL — SIGNIFICANT CHANGE UP (ref 4.2–5.8)
RBC # FLD: 12.3 % — SIGNIFICANT CHANGE UP (ref 10.3–14.5)
SARS-COV-2 IGG+IGM SERPL QL IA: 10.6 U/ML — HIGH
SARS-COV-2 IGG+IGM SERPL QL IA: POSITIVE
SODIUM SERPL-SCNC: 138 MMOL/L — SIGNIFICANT CHANGE UP (ref 135–145)
WBC # BLD: 20.79 K/UL — HIGH (ref 3.8–10.5)
WBC # FLD AUTO: 20.79 K/UL — HIGH (ref 3.8–10.5)

## 2021-10-21 PROCEDURE — 99233 SBSQ HOSP IP/OBS HIGH 50: CPT

## 2021-10-21 PROCEDURE — 99221 1ST HOSP IP/OBS SF/LOW 40: CPT

## 2021-10-21 RX ORDER — SENNA PLUS 8.6 MG/1
2 TABLET ORAL AT BEDTIME
Refills: 0 | Status: DISCONTINUED | OUTPATIENT
Start: 2021-10-21 | End: 2021-10-24

## 2021-10-21 RX ORDER — METOPROLOL TARTRATE 50 MG
5 TABLET ORAL EVERY 6 HOURS
Refills: 0 | Status: DISCONTINUED | OUTPATIENT
Start: 2021-10-21 | End: 2021-10-21

## 2021-10-21 RX ORDER — CEFAZOLIN SODIUM 1 G
2000 VIAL (EA) INJECTION EVERY 8 HOURS
Refills: 0 | Status: COMPLETED | OUTPATIENT
Start: 2021-10-21 | End: 2021-10-21

## 2021-10-21 RX ORDER — SODIUM CHLORIDE 9 MG/ML
1000 INJECTION INTRAMUSCULAR; INTRAVENOUS; SUBCUTANEOUS ONCE
Refills: 0 | Status: COMPLETED | OUTPATIENT
Start: 2021-10-21 | End: 2021-10-21

## 2021-10-21 RX ORDER — NICOTINE POLACRILEX 2 MG
1 GUM BUCCAL DAILY
Refills: 0 | Status: DISCONTINUED | OUTPATIENT
Start: 2021-10-21 | End: 2021-10-22

## 2021-10-21 RX ORDER — OXYCODONE HYDROCHLORIDE 5 MG/1
15 TABLET ORAL EVERY 4 HOURS
Refills: 0 | Status: DISCONTINUED | OUTPATIENT
Start: 2021-10-21 | End: 2021-10-24

## 2021-10-21 RX ORDER — OXYCODONE HYDROCHLORIDE 5 MG/1
10 TABLET ORAL EVERY 4 HOURS
Refills: 0 | Status: DISCONTINUED | OUTPATIENT
Start: 2021-10-21 | End: 2021-10-24

## 2021-10-21 RX ORDER — SODIUM CHLORIDE 9 MG/ML
500 INJECTION INTRAMUSCULAR; INTRAVENOUS; SUBCUTANEOUS
Refills: 0 | Status: DISCONTINUED | OUTPATIENT
Start: 2021-10-21 | End: 2021-10-22

## 2021-10-21 RX ORDER — METOPROLOL TARTRATE 50 MG
5 TABLET ORAL EVERY 6 HOURS
Refills: 0 | Status: DISCONTINUED | OUTPATIENT
Start: 2021-10-21 | End: 2021-10-22

## 2021-10-21 RX ADMIN — HYDROMORPHONE HYDROCHLORIDE 0.5 MILLIGRAM(S): 2 INJECTION INTRAMUSCULAR; INTRAVENOUS; SUBCUTANEOUS at 17:09

## 2021-10-21 RX ADMIN — Medication 100 MILLIGRAM(S): at 11:51

## 2021-10-21 RX ADMIN — OXYCODONE HYDROCHLORIDE 15 MILLIGRAM(S): 5 TABLET ORAL at 19:58

## 2021-10-21 RX ADMIN — OXYCODONE HYDROCHLORIDE 10 MILLIGRAM(S): 5 TABLET ORAL at 14:00

## 2021-10-21 RX ADMIN — OXYCODONE HYDROCHLORIDE 10 MILLIGRAM(S): 5 TABLET ORAL at 13:22

## 2021-10-21 RX ADMIN — Medication 1 TABLET(S): at 12:12

## 2021-10-21 RX ADMIN — METHOCARBAMOL 750 MILLIGRAM(S): 500 TABLET, FILM COATED ORAL at 21:38

## 2021-10-21 RX ADMIN — Medication 5 MILLIGRAM(S): at 01:51

## 2021-10-21 RX ADMIN — TIOTROPIUM BROMIDE 1 CAPSULE(S): 18 CAPSULE ORAL; RESPIRATORY (INHALATION) at 08:37

## 2021-10-21 RX ADMIN — LOSARTAN POTASSIUM 100 MILLIGRAM(S): 100 TABLET, FILM COATED ORAL at 05:49

## 2021-10-21 RX ADMIN — OXYCODONE HYDROCHLORIDE 10 MILLIGRAM(S): 5 TABLET ORAL at 09:27

## 2021-10-21 RX ADMIN — GABAPENTIN 400 MILLIGRAM(S): 400 CAPSULE ORAL at 05:49

## 2021-10-21 RX ADMIN — ATORVASTATIN CALCIUM 20 MILLIGRAM(S): 80 TABLET, FILM COATED ORAL at 21:38

## 2021-10-21 RX ADMIN — GABAPENTIN 400 MILLIGRAM(S): 400 CAPSULE ORAL at 21:55

## 2021-10-21 RX ADMIN — METHOCARBAMOL 750 MILLIGRAM(S): 500 TABLET, FILM COATED ORAL at 13:22

## 2021-10-21 RX ADMIN — Medication 1 PATCH: at 17:55

## 2021-10-21 RX ADMIN — Medication 2: at 12:13

## 2021-10-21 RX ADMIN — GABAPENTIN 400 MILLIGRAM(S): 400 CAPSULE ORAL at 13:23

## 2021-10-21 RX ADMIN — METHOCARBAMOL 750 MILLIGRAM(S): 500 TABLET, FILM COATED ORAL at 05:49

## 2021-10-21 RX ADMIN — SODIUM CHLORIDE 250 MILLILITER(S): 9 INJECTION INTRAMUSCULAR; INTRAVENOUS; SUBCUTANEOUS at 20:47

## 2021-10-21 RX ADMIN — HYDROMORPHONE HYDROCHLORIDE 0.5 MILLIGRAM(S): 2 INJECTION INTRAMUSCULAR; INTRAVENOUS; SUBCUTANEOUS at 22:27

## 2021-10-21 RX ADMIN — Medication 1 PATCH: at 11:52

## 2021-10-21 RX ADMIN — Medication 2: at 21:57

## 2021-10-21 RX ADMIN — Medication 100 MILLIGRAM(S): at 18:04

## 2021-10-21 RX ADMIN — SODIUM CHLORIDE 1000 MILLILITER(S): 9 INJECTION INTRAMUSCULAR; INTRAVENOUS; SUBCUTANEOUS at 08:11

## 2021-10-21 RX ADMIN — Medication 5 MILLIGRAM(S): at 21:00

## 2021-10-21 RX ADMIN — HYDROMORPHONE HYDROCHLORIDE 0.5 MILLIGRAM(S): 2 INJECTION INTRAMUSCULAR; INTRAVENOUS; SUBCUTANEOUS at 22:12

## 2021-10-21 RX ADMIN — OXYCODONE HYDROCHLORIDE 15 MILLIGRAM(S): 5 TABLET ORAL at 20:58

## 2021-10-21 RX ADMIN — OXYCODONE HYDROCHLORIDE 10 MILLIGRAM(S): 5 TABLET ORAL at 10:27

## 2021-10-21 RX ADMIN — HYDROMORPHONE HYDROCHLORIDE 0.5 MILLIGRAM(S): 2 INJECTION INTRAMUSCULAR; INTRAVENOUS; SUBCUTANEOUS at 06:18

## 2021-10-21 RX ADMIN — Medication 2: at 17:07

## 2021-10-21 RX ADMIN — HYDROMORPHONE HYDROCHLORIDE 0.5 MILLIGRAM(S): 2 INJECTION INTRAMUSCULAR; INTRAVENOUS; SUBCUTANEOUS at 05:48

## 2021-10-21 RX ADMIN — Medication 2: at 08:12

## 2021-10-21 RX ADMIN — HYDROMORPHONE HYDROCHLORIDE 0.5 MILLIGRAM(S): 2 INJECTION INTRAMUSCULAR; INTRAVENOUS; SUBCUTANEOUS at 17:58

## 2021-10-21 NOTE — PHYSICAL THERAPY INITIAL EVALUATION ADULT - GENERAL OBSERVATIONS, REHAB EVAL
Pt. greeted resting in bed + chest tube, monitor, IV, ramsey, wife at bedside. Pt. confused but agreeable to PT Pt. greeted resting in bed +  JUJU drain, IVx2, monitor, 5L O2 via NC, agreeable to PT

## 2021-10-21 NOTE — PROGRESS NOTE ADULT - SUBJECTIVE AND OBJECTIVE BOX
51 year old male hx COPD on Anoro ellipita/ inhaler use, MARCUS cpap use, DM, Diabetic neuropathy, HLD, HTN, appendectomy 1992 presents for elective suboccipital craniectomy and c1/c2 laminectomy for Chiari malformation with Dr. Araiza. He has history of neck pain and b/l hand weakness. He had outpatient MRI which confirmed Chiari malformation. Patient is admitted to the NSICU POD 0 suboccipital craniectomy, C1-C2 laminectomy.     O/n events: none reported  REVIEW OF SYSTEMS: neck pain.  VS, labs, imaging reviewed.    PHYSICAL EXAM:  GENERAL: NAD  HEAD: dressing, SG drain in place.  MENTAL STATUS: AAO x3; Awake; Opens eyes spontaneously; Appropriately conversant without aphasia; following simple commands  CRANIAL NERVES: Visual acuity normal for age, visual fields full to confrontation, PERRL. EOMI without nystagmus. Facial sensation intact V1-3 distribution b/l. Face symmetric w/ normal eye closure and smile, tongue midline. Hearing grossly intact. Speech clear.   MOTOR: strength 5/5 b/l upper and lower extremities  SENSATION: grossly intact to light touch all extremities.  CHEST/LUNG: Clear to auscultation bilaterally; no rales, rhonchi, wheezing, or rubs  HEART: +S1/+S2; Regular rate and rhythm; no murmurs, rubs, or gallops  ABDOMEN: Soft, nontender, nondistended; bowel sounds present all four quadrants  EXTREMITIES:  2+ peripheral pulses, no clubbing, cyanosis, or edema  SKIN: Warm, dry; no rashes or lesions  Bedside sono: Alines, +lung sliding, very limited cardiac view - LV syst function preserved.

## 2021-10-21 NOTE — PHYSICAL THERAPY INITIAL EVALUATION ADULT - PERTINENT HX OF CURRENT PROBLEM, REHAB EVAL
52 y/o male w/ Hx of COPD, MARCUS cpap use, DM, M, HLD, HTN, admitted for elective suboccipital craniectomy and c1/c2 laminectomy for Chiari malformation with Dr. Araiza.

## 2021-10-21 NOTE — PROGRESS NOTE ADULT - SUBJECTIVE AND OBJECTIVE BOX
50 y/o male w/ Hx of COPD, MARCUS cpap use, DM, M, HLD, HTN, admitted for elective suboccipital craniectomy and c1/c2 laminectomy for Chiari malformation with Dr. Araiza. He has history of neck pain and b/l hand weakness. He had outpatient MRI which confirmed Chiari malformation. Patient is admitted to the NSICU on 10/20 for suboccipital craniectomy, C1-C2 laminectomy.    Interval History:  24 Hour Events: No acute events.    1. Patient's Neurologic Exam unchanged.    2. Patient's Hemodynamic's maintained without drips.    3. Patient's Respiratory status is: adequate    4. Patient is pending: Patient is pending further neurologic recovery, further neurologic, respiratory, and hemodynamic monitoring & management in the ICU.     5. Dispo: ICU for now, downgrade when clinically stable.       Physical Exam:  Constitutional: NAD    Neuro  * Mental Status:  GCS 15:  E(4), V(5), M(6).  Awake, alert, oriented to conversation.  * Cranial Nerves: Cnii-Cnxii grossly intact. PERRL, EOMI, tongue midline, no gaze deviation  * Motor: RUE 5/5, LUE 5/5, RLE 5/5, LLE 5/5  * Sensory: Sensation intact to light touch  * Reflexes: Not assessed  * Gait: Not assessed  * Incision C/D/I, SG drain with bloody output     Vitals:  T(C): 37.1 (21 Oct 2021 00:00), Max: 37.1 (21 Oct 2021 00:00)  T(F): 98.8 (21 Oct 2021 00:00), Max: 98.8 (21 Oct 2021 00:00)  HR: 105 (21 Oct 2021 02:00) (94 - 127)  BP: 116/87 (21 Oct 2021 02:00) (99/78 - 152/96)  BP(mean): 98 (21 Oct 2021 02:00) (81 - 113)  RR: 15 (21 Oct 2021 02:00) (11 - 20)  SpO2: 93% (21 Oct 2021 02:00) (90% - 100%)    I&O:    20 Oct 2021 07:01  -  21 Oct 2021 02:49  --------------------------------------------------------  IN: 1525 mL / OUT: 3040 mL / NET: -1515 mL        Labs & Radiology:                        16.4   19.97 )-----------( 370      ( 20 Oct 2021 20:02 )             50.1       10-20    135  |  99  |  14.8  ----------------------------<  245<H>  4.7   |  21.0<L>  |  0.99    Ca    8.6      20 Oct 2021 20:02  Phos  3.5     10-20  Mg     1.9     10-20    CAPILLARY BLOOD GLUCOSE  POCT Blood Glucose.: 208 mg/dL (20 Oct 2021 21:08)  POCT Blood Glucose.: 169 mg/dL (20 Oct 2021 06:57)      Neurosurgery Imaging:  10/20 CT Head without Contrast: anticipated postoperative changes.       Medications:  STANDING:  atorvastatin 20 milliGRAM(s) Oral at bedtime  cefTRIAXone   IVPB 1000 milliGRAM(s) IV Intermittent every 24 hours  dextrose 40% Gel 15 Gram(s) Oral once  dextrose 5%. 1000 milliLiter(s) (50 mL/Hr) IV Continuous <Continuous>  dextrose 5%. 1000 milliLiter(s) (100 mL/Hr) IV Continuous <Continuous>  dextrose 50% Injectable 25 Gram(s) IV Push once  dextrose 50% Injectable 12.5 Gram(s) IV Push once  dextrose 50% Injectable 25 Gram(s) IV Push once  gabapentin 400 milliGRAM(s) Oral three times a day  glucagon  Injectable 1 milliGRAM(s) IntraMuscular once  insulin lispro (ADMELOG) corrective regimen sliding scale   SubCutaneous Before meals and at bedtime  losartan 100 milliGRAM(s) Oral daily  methocarbamol 750 milliGRAM(s) Oral every 8 hours  multivitamin 1 Tablet(s) Oral daily  sodium chloride 0.9%. 1000 milliLiter(s) (75 mL/Hr) IV Continuous <Continuous>  tiotropium 18 MICROgram(s) Capsule 1 Capsule(s) Inhalation daily    PRN:  acetaminophen   Tablet .. 650 milliGRAM(s) Oral every 6 hours PRN Mild Pain (1 - 3)  ALBUTerol    90 MICROgram(s) HFA Inhaler 2 Puff(s) Inhalation every 6 hours PRN Bronchospasm  hydrALAZINE Injectable 10 milliGRAM(s) IV Push every 4 hours PRN SBP>150  HYDROmorphone  Injectable 0.5 milliGRAM(s) IV Push every 4 hours PRN Breakthrough pain  labetalol Injectable 10 milliGRAM(s) IV Push every 4 hours PRN Systolic blood pressure >150  metoprolol tartrate Injectable 5 milliGRAM(s) IV Push every 6 hours PRN HR>110  ondansetron Injectable 4 milliGRAM(s) IV Push every 6 hours PRN Nausea and/or Vomiting  oxyCODONE    IR 5 milliGRAM(s) Oral every 4 hours PRN Moderate Pain (4 - 6)  oxyCODONE    IR 10 milliGRAM(s) Oral every 4 hours PRN Severe Pain (7 - 10)  senna 2 Tablet(s) Oral at bedtime PRN Constipation      Assessment:  51 year old male now POD 1 from suboccipital crani with C1/C2 laminectomy for chiari malformation.     Continue Neuro Checks  JUJU to full suction  pain control PRN  Maintain BP within desired range  Continue diet  Medical care per Neuro ICU    Will Discuss case with Dr. Araiza.

## 2021-10-21 NOTE — CHART NOTE - NSCHARTNOTEFT_GEN_A_CORE
NCCU Event Note:    Discussed patient's vital signs with MD Boykin.  Suggests optimizing pain control and considers other possible etiologies of tachycardia to be from nicotine withdrawal, does not other work up for now. NCCU Event Note:    Discussed patient's vital signs with MD Boykin.  Suggests optimizing pain control and considers other possible etiologies of tachycardia to be from nicotine withdrawal, does not other work up for now.  Transfer out of ICU cancelled.

## 2021-10-21 NOTE — CHART NOTE - NSCHARTNOTEFT_GEN_A_CORE
HPI: 50 y/o male w/ Hx of COPD, MARCUS cpap use, DM, M, HLD, HTN, admitted for elective suboccipital craniectomy and c1/c2 laminectomy for Chiari malformation with Dr. Araiza. He has history of neck pain and b/l hand weakness. He had outpatient MRI which confirmed Chiari malformation. Patient is admitted to the NSICU on 10/20 for suboccipital craniectomy, C1-C2 laminectomy.    Physical Exam:   Constitutional: NAD    Neuro  * Mental Status:  GCS 15:  E(4), V(5), M(6).  Awake, alert, oriented to conversation.  * Cranial Nerves: Cnii-Cnxii grossly intact. PERRL, EOMI, tongue midline, no gaze deviation  * Motor: RUE 5/5, LUE 5/5, RLE 5/5, LLE 5/5  * Sensory: Sensation intact to light touch  * Reflexes: Not assessed  * Gait: Not assessed  * Incision C/D/I, SG drain with bloody output     Plan  Neuro: q2 neuro checks, Ty, Oxy PRN, Dilaudid, Robaxin PRN for pain. Gabapentin 400 TID continued for home meds   Cards: Sinus tachycardia likely pain related, post op atelectasis. Metoprolol PRN for HR > 120, Labetolol / Hydralazine PRN for SBP goal 100-150. Continue home Losartan   Resp: MARCUS CPAP at night. Albuterol PRN, tiotropium   GI: CC diet,. senna, zofran PRN   : voiding   ID: ancef for drain ppx, UA negative   Heme: SCDs only for DVT ppx   Endo: ISS   Drains: continue SG drain to half suction   PT/OT/OOB     Downgraded to nsg service with medicine consult.

## 2021-10-21 NOTE — PROGRESS NOTE ADULT - ASSESSMENT
51M hx COPD, MARCUS on cpap, DM with Diabetic neuropathy, HLD, HTN, s/p elective suboccipital craniectomy and c1/c2 laminectomy for Chiari malformation 10/20.  Sinus tachycardia, likely pain related.    Plan:  Neuro checks q2 hours  SG drain to full suction per NSGy; possible removal in am; pain mgmt  CPAP at night, cont home meds  maintain -150  diet as tolerated  SCDs, eval for chemoppx in am.   stable to be downgraded to SDU

## 2021-10-21 NOTE — PHYSICAL THERAPY INITIAL EVALUATION ADULT - ADDITIONAL COMMENTS
Pt. reports he lives alone in a house with his wife and has 5 children who can assist at all times. Pt. reports there's 1 HOLA and no stairs inside. Pt. was modified independent with a SAC and does not own DME.

## 2021-10-22 LAB
ALBUMIN SERPL ELPH-MCNC: 3.7 G/DL — SIGNIFICANT CHANGE UP (ref 3.3–5.2)
ALP SERPL-CCNC: 146 U/L — HIGH (ref 40–120)
ALT FLD-CCNC: 158 U/L — HIGH
ANION GAP SERPL CALC-SCNC: 16 MMOL/L — SIGNIFICANT CHANGE UP (ref 5–17)
APPEARANCE UR: CLEAR — SIGNIFICANT CHANGE UP
AST SERPL-CCNC: 74 U/L — HIGH
BILIRUB DIRECT SERPL-MCNC: 0.3 MG/DL — SIGNIFICANT CHANGE UP (ref 0–0.3)
BILIRUB INDIRECT FLD-MCNC: 0.5 MG/DL — SIGNIFICANT CHANGE UP (ref 0.2–1)
BILIRUB SERPL-MCNC: 0.8 MG/DL — SIGNIFICANT CHANGE UP (ref 0.4–2)
BILIRUB UR-MCNC: NEGATIVE — SIGNIFICANT CHANGE UP
BUN SERPL-MCNC: 15.9 MG/DL — SIGNIFICANT CHANGE UP (ref 8–20)
CALCIUM SERPL-MCNC: 8.9 MG/DL — SIGNIFICANT CHANGE UP (ref 8.6–10.2)
CHLORIDE SERPL-SCNC: 98 MMOL/L — SIGNIFICANT CHANGE UP (ref 98–107)
CO2 SERPL-SCNC: 22 MMOL/L — SIGNIFICANT CHANGE UP (ref 22–29)
COLOR SPEC: YELLOW — SIGNIFICANT CHANGE UP
CREAT SERPL-MCNC: 0.58 MG/DL — SIGNIFICANT CHANGE UP (ref 0.5–1.3)
DIFF PNL FLD: ABNORMAL
EPI CELLS # UR: SIGNIFICANT CHANGE UP
GLUCOSE BLDC GLUCOMTR-MCNC: 159 MG/DL — HIGH (ref 70–99)
GLUCOSE BLDC GLUCOMTR-MCNC: 164 MG/DL — HIGH (ref 70–99)
GLUCOSE BLDC GLUCOMTR-MCNC: 175 MG/DL — HIGH (ref 70–99)
GLUCOSE BLDC GLUCOMTR-MCNC: 199 MG/DL — HIGH (ref 70–99)
GLUCOSE SERPL-MCNC: 186 MG/DL — HIGH (ref 70–99)
GLUCOSE UR QL: 250 MG/DL
HCT VFR BLD CALC: 47.1 % — SIGNIFICANT CHANGE UP (ref 39–50)
HGB BLD-MCNC: 15.6 G/DL — SIGNIFICANT CHANGE UP (ref 13–17)
KETONES UR-MCNC: ABNORMAL
LEUKOCYTE ESTERASE UR-ACNC: NEGATIVE — SIGNIFICANT CHANGE UP
MAGNESIUM SERPL-MCNC: 1.9 MG/DL — SIGNIFICANT CHANGE UP (ref 1.6–2.6)
MCHC RBC-ENTMCNC: 32.7 PG — SIGNIFICANT CHANGE UP (ref 27–34)
MCHC RBC-ENTMCNC: 33.1 GM/DL — SIGNIFICANT CHANGE UP (ref 32–36)
MCV RBC AUTO: 98.7 FL — SIGNIFICANT CHANGE UP (ref 80–100)
NITRITE UR-MCNC: NEGATIVE — SIGNIFICANT CHANGE UP
PH UR: 7 — SIGNIFICANT CHANGE UP (ref 5–8)
PHOSPHATE SERPL-MCNC: 2.6 MG/DL — SIGNIFICANT CHANGE UP (ref 2.4–4.7)
PLATELET # BLD AUTO: 332 K/UL — SIGNIFICANT CHANGE UP (ref 150–400)
POTASSIUM SERPL-MCNC: 4.1 MMOL/L — SIGNIFICANT CHANGE UP (ref 3.5–5.3)
POTASSIUM SERPL-SCNC: 4.1 MMOL/L — SIGNIFICANT CHANGE UP (ref 3.5–5.3)
PROT SERPL-MCNC: 6.6 G/DL — SIGNIFICANT CHANGE UP (ref 6.6–8.7)
PROT UR-MCNC: 100 MG/DL
RBC # BLD: 4.77 M/UL — SIGNIFICANT CHANGE UP (ref 4.2–5.8)
RBC # FLD: 12.4 % — SIGNIFICANT CHANGE UP (ref 10.3–14.5)
RBC CASTS # UR COMP ASSIST: ABNORMAL /HPF (ref 0–4)
SODIUM SERPL-SCNC: 136 MMOL/L — SIGNIFICANT CHANGE UP (ref 135–145)
SP GR SPEC: 1.01 — SIGNIFICANT CHANGE UP (ref 1.01–1.02)
UROBILINOGEN FLD QL: NEGATIVE MG/DL — SIGNIFICANT CHANGE UP
WBC # BLD: 20.71 K/UL — HIGH (ref 3.8–10.5)
WBC # FLD AUTO: 20.71 K/UL — HIGH (ref 3.8–10.5)
WBC UR QL: SIGNIFICANT CHANGE UP

## 2021-10-22 PROCEDURE — 99232 SBSQ HOSP IP/OBS MODERATE 35: CPT

## 2021-10-22 PROCEDURE — 99233 SBSQ HOSP IP/OBS HIGH 50: CPT

## 2021-10-22 PROCEDURE — 93970 EXTREMITY STUDY: CPT | Mod: 26

## 2021-10-22 PROCEDURE — 99223 1ST HOSP IP/OBS HIGH 75: CPT

## 2021-10-22 PROCEDURE — 93010 ELECTROCARDIOGRAM REPORT: CPT

## 2021-10-22 RX ORDER — POLYETHYLENE GLYCOL 3350 17 G/17G
17 POWDER, FOR SOLUTION ORAL DAILY
Refills: 0 | Status: DISCONTINUED | OUTPATIENT
Start: 2021-10-22 | End: 2021-10-24

## 2021-10-22 RX ORDER — DILTIAZEM HCL 120 MG
30 CAPSULE, EXT RELEASE 24 HR ORAL EVERY 8 HOURS
Refills: 0 | Status: DISCONTINUED | OUTPATIENT
Start: 2021-10-22 | End: 2021-10-22

## 2021-10-22 RX ORDER — NICOTINE POLACRILEX 2 MG
1 GUM BUCCAL DAILY
Refills: 0 | Status: DISCONTINUED | OUTPATIENT
Start: 2021-10-22 | End: 2021-10-24

## 2021-10-22 RX ORDER — DILTIAZEM HCL 120 MG
30 CAPSULE, EXT RELEASE 24 HR ORAL EVERY 8 HOURS
Refills: 0 | Status: DISCONTINUED | OUTPATIENT
Start: 2021-10-22 | End: 2021-10-23

## 2021-10-22 RX ORDER — ENOXAPARIN SODIUM 100 MG/ML
40 INJECTION SUBCUTANEOUS AT BEDTIME
Refills: 0 | Status: DISCONTINUED | OUTPATIENT
Start: 2021-10-22 | End: 2021-10-24

## 2021-10-22 RX ORDER — METHOCARBAMOL 500 MG/1
1500 TABLET, FILM COATED ORAL EVERY 8 HOURS
Refills: 0 | Status: COMPLETED | OUTPATIENT
Start: 2021-10-22 | End: 2021-10-24

## 2021-10-22 RX ORDER — METOPROLOL TARTRATE 50 MG
25 TABLET ORAL
Refills: 0 | Status: DISCONTINUED | OUTPATIENT
Start: 2021-10-22 | End: 2021-10-22

## 2021-10-22 RX ADMIN — GABAPENTIN 400 MILLIGRAM(S): 400 CAPSULE ORAL at 15:12

## 2021-10-22 RX ADMIN — Medication 1 PATCH: at 11:50

## 2021-10-22 RX ADMIN — Medication 1 PATCH: at 08:21

## 2021-10-22 RX ADMIN — OXYCODONE HYDROCHLORIDE 15 MILLIGRAM(S): 5 TABLET ORAL at 15:13

## 2021-10-22 RX ADMIN — POLYETHYLENE GLYCOL 3350 17 GRAM(S): 17 POWDER, FOR SOLUTION ORAL at 12:03

## 2021-10-22 RX ADMIN — Medication 2: at 08:20

## 2021-10-22 RX ADMIN — Medication 30 MILLIGRAM(S): at 21:34

## 2021-10-22 RX ADMIN — ENOXAPARIN SODIUM 40 MILLIGRAM(S): 100 INJECTION SUBCUTANEOUS at 21:34

## 2021-10-22 RX ADMIN — Medication 2: at 12:02

## 2021-10-22 RX ADMIN — METHOCARBAMOL 1500 MILLIGRAM(S): 500 TABLET, FILM COATED ORAL at 15:12

## 2021-10-22 RX ADMIN — OXYCODONE HYDROCHLORIDE 10 MILLIGRAM(S): 5 TABLET ORAL at 21:30

## 2021-10-22 RX ADMIN — Medication 30 MILLIGRAM(S): at 15:57

## 2021-10-22 RX ADMIN — Medication 2: at 21:34

## 2021-10-22 RX ADMIN — SENNA PLUS 2 TABLET(S): 8.6 TABLET ORAL at 21:34

## 2021-10-22 RX ADMIN — METHOCARBAMOL 750 MILLIGRAM(S): 500 TABLET, FILM COATED ORAL at 06:29

## 2021-10-22 RX ADMIN — Medication 2: at 17:35

## 2021-10-22 RX ADMIN — OXYCODONE HYDROCHLORIDE 15 MILLIGRAM(S): 5 TABLET ORAL at 04:30

## 2021-10-22 RX ADMIN — OXYCODONE HYDROCHLORIDE 15 MILLIGRAM(S): 5 TABLET ORAL at 05:30

## 2021-10-22 RX ADMIN — METHOCARBAMOL 1500 MILLIGRAM(S): 500 TABLET, FILM COATED ORAL at 21:34

## 2021-10-22 RX ADMIN — OXYCODONE HYDROCHLORIDE 10 MILLIGRAM(S): 5 TABLET ORAL at 11:00

## 2021-10-22 RX ADMIN — GABAPENTIN 400 MILLIGRAM(S): 400 CAPSULE ORAL at 06:29

## 2021-10-22 RX ADMIN — OXYCODONE HYDROCHLORIDE 10 MILLIGRAM(S): 5 TABLET ORAL at 10:04

## 2021-10-22 RX ADMIN — ATORVASTATIN CALCIUM 20 MILLIGRAM(S): 80 TABLET, FILM COATED ORAL at 21:34

## 2021-10-22 RX ADMIN — TIOTROPIUM BROMIDE 1 CAPSULE(S): 18 CAPSULE ORAL; RESPIRATORY (INHALATION) at 09:54

## 2021-10-22 RX ADMIN — Medication 1 PATCH: at 12:03

## 2021-10-22 RX ADMIN — OXYCODONE HYDROCHLORIDE 10 MILLIGRAM(S): 5 TABLET ORAL at 20:28

## 2021-10-22 RX ADMIN — Medication 1 TABLET(S): at 12:03

## 2021-10-22 RX ADMIN — Medication 25 MILLIGRAM(S): at 01:22

## 2021-10-22 RX ADMIN — Medication 62.5 MILLIMOLE(S): at 08:22

## 2021-10-22 RX ADMIN — LOSARTAN POTASSIUM 100 MILLIGRAM(S): 100 TABLET, FILM COATED ORAL at 06:29

## 2021-10-22 RX ADMIN — GABAPENTIN 400 MILLIGRAM(S): 400 CAPSULE ORAL at 21:34

## 2021-10-22 NOTE — DIETITIAN INITIAL EVALUATION ADULT. - ADD RECOMMEND
Continue MVI. Encourage po intake, monitor diet tolerance, and provide assistance at meals as needed. Obtain daily weights to monitor trends.

## 2021-10-22 NOTE — PROGRESS NOTE ADULT - ASSESSMENT
Patient is a 51 year old male with pmh of MARCUS non compliant with bipap, morbid obesity, dm2, hld and htn presents to [facility] 10/20 for planned surgery for a chiari malformation.  Patient is s.p Suboccipital craniectomy for Chiari malformation post op day #1    #Chiari malformation  -  - s.p craniectomy post op day #2  - plan as per neurosurgery   - pain meds adjustment being done by Primary team     #sinus tachycardia  -likely secondary to pain   - had recent cardiac clearance as outpatient , likely not a cardiac issue  - maintain tele   EKG with sinus tachycardia, will get TSH, cardiology consulted by Neuro team    #dm2 - c.w ssi   - a1c 8.3    #htn -  c.w losartan     #smoker - agree with nicotine patch, counselled for cessation    Leucocytosis: Likely chronic, worse due to post op, no focus of infection, will monitor     Hospitalist team is going to follow along.

## 2021-10-22 NOTE — DIETITIAN INITIAL EVALUATION ADULT. - PERTINENT MEDS FT
MEDICATIONS  (STANDING):  atorvastatin 20 milliGRAM(s) Oral at bedtime  dextrose 40% Gel 15 Gram(s) Oral once  dextrose 5%. 1000 milliLiter(s) (50 mL/Hr) IV Continuous <Continuous>  dextrose 5%. 1000 milliLiter(s) (100 mL/Hr) IV Continuous <Continuous>  dextrose 50% Injectable 25 Gram(s) IV Push once  dextrose 50% Injectable 12.5 Gram(s) IV Push once  dextrose 50% Injectable 25 Gram(s) IV Push once  gabapentin 400 milliGRAM(s) Oral three times a day  glucagon  Injectable 1 milliGRAM(s) IntraMuscular once  insulin lispro (ADMELOG) corrective regimen sliding scale   SubCutaneous Before meals and at bedtime  losartan 100 milliGRAM(s) Oral daily  methocarbamol 1500 milliGRAM(s) Oral every 8 hours  metoprolol tartrate 25 milliGRAM(s) Oral two times a day  multivitamin 1 Tablet(s) Oral daily  nicotine -  14 mG/24Hr(s) Patch 1 patch Transdermal daily  polyethylene glycol 3350 17 Gram(s) Oral daily  senna 2 Tablet(s) Oral at bedtime  tiotropium 18 MICROgram(s) Capsule 1 Capsule(s) Inhalation daily    MEDICATIONS  (PRN):  acetaminophen   Tablet .. 650 milliGRAM(s) Oral every 6 hours PRN Mild Pain (1 - 3)  ALBUTerol    90 MICROgram(s) HFA Inhaler 2 Puff(s) Inhalation every 6 hours PRN Bronchospasm  hydrALAZINE Injectable 10 milliGRAM(s) IV Push every 4 hours PRN SBP>150  HYDROmorphone  Injectable 0.5 milliGRAM(s) IV Push every 4 hours PRN Breakthrough pain  labetalol Injectable 10 milliGRAM(s) IV Push every 4 hours PRN Systolic blood pressure >150  metoprolol tartrate Injectable 5 milliGRAM(s) IV Push every 6 hours PRN Heart Rate > 110 Sustained  ondansetron Injectable 4 milliGRAM(s) IV Push every 6 hours PRN Nausea and/or Vomiting  oxyCODONE    IR 10 milliGRAM(s) Oral every 4 hours PRN Moderate Pain (4 - 6)  oxyCODONE    IR 15 milliGRAM(s) Oral every 4 hours PRN Severe Pain (7 - 10)

## 2021-10-22 NOTE — CONSULT NOTE ADULT - SUBJECTIVE AND OBJECTIVE BOX
[facility]                                                               Office:  [address] [address]                                                              Telephone: [phone]. Fax:[phone]                                                                        CARDIOLOGY CONSULTATION NOTE                                                                                             Consult requested by:  Dr. Boykin  Reason for Consultation: Tachycardia  Primary Cardiologist: Dr. Gill  History obtained by: Patient and medical record   obtained: No    Chief complaint:    Patient is a 51y old  Male who presents with a chief complaint of Chiari malformation surgery (22 Oct 2021 10:04)      HPI: 52 y/o M active smoker with a PMHx of Obesity, HTN, HLD, MARCUS (on CPAP), COPD (on anoro ellipta), and chiari malformation who presented to the hospital for a suboccipital craniectomy and duraplasty for decompression of Chiari malformation on 10/20/21. Patient is now POD #2, and overnight had some persistent tachycardia. Patient states that he has some palpitations at home, and has some dyspnea on exertion at baseline from COPD. Patient states that he is feeling ok now, but is requiring oxygen for hypoxia at this time. Patient denies any fevers, chills, CP, syncope, near syncope, abdominal pain, N/V/D, headache, or dizziness.     REVIEW OF SYMPTOMS:     CONSTITUTIONAL: No fever, weight loss, or fatigue  ENMT:  No difficulty hearing, tinnitus, vertigo; No sinus or throat pain  NECK: No pain or stiffness  CARDIOVASCULAR: AS PER HPI  RESPIRATORY: Dyspnea on exertion, Shortness of breath, cough, wheezing  : No dysuria, no hematuria   GI: No dark color stool, no melena, no diarrhea, no constipation, no abdominal pain   NEURO: AS PER HPI   MUSCULOSKELETAL: No joint pain or swelling; No muscle, back, or extremity pain  PSYCH: No agitation, no anxiety.    ALL OTHER REVIEW OF SYSTEMS ARE NEGATIVE.      PREVIOUS DIAGNOSTIC TESTING  ECHO FINDINGS:  Outpatient Echo 10/18/21  Normal EF, no significant valvular abnormalities      ALLERGIES: Allergies    fish (Unknown)  penicillin (Unknown)  shellfish (Unknown)    Intolerances      PAST MEDICAL HISTORY  Obstructive sleep apnea    Hypertension    Hyperlipidemia    Lumbar herniated disc    Diabetes mellitus    COPD (chronic obstructive pulmonary disease)      PAST SURGICAL HISTORY  History of appendectomy        FAMILY HISTORY:  No pertinent family history in first degree relatives        SOCIAL HISTORY:    CIGARETTES:   Active smoker, now < 1 PPD. 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  dextrose 5%.  dextrose 50% Injectable  dextrose 50% Injectable  dextrose 50% Injectable  enoxaparin Injectable  glucagon  Injectable  insulin lispro (ADMELOG) corrective regimen sliding scale  multivitamin  nicotine -  14 mG/24Hr(s) Patch        HOME MEDICATIONS:      Vital Signs Last 24 Hrs  T(C): 36.7 (22 Oct 2021 12:00), Max: 37.3 (21 Oct 2021 23:46)  T(F): 98.1 (22 Oct 2021 12:00), Max: 99.1 (21 Oct 2021 23:46)  HR: 124 (22 Oct 2021 10:00) (103 - 130)  BP: 171/91 (22 Oct 2021 10:00) (118/77 - 171/91)  BP(mean): 110 (22 Oct 2021 10:00) (87 - 118)  RR: 25 (22 Oct 2021 10:00) (14 - 28)  SpO2: 91% (22 Oct 2021 10:00) (90% - 96%)      PHYSICAL EXAM:  Constitutional: Comfortable . No acute distress.   HEENT: Atraumatic and normocephalic , neck is supple . no JVD. No carotid bruit. PEERL   CNS: A&Ox3. No focal deficits. EOMI. Cranial nerves II-IX are intact.   Lymph Nodes: Cervical : Not palpable.  Respiratory: CTAB  Cardiovascular: S1S2 tachycardia. No murmur/rubs or gallop.  Gastrointestinal: Soft non-tender and non distended . +Bowel sounds. negative Fitzpatrick's sign.  Extremities: No edema.   Psychiatric: Calm . no agitation.  Skin: No skin rash/ulcers visualized to face, hands or feet.    Intake and output:   10- @ 07:01  -  10- @ 07:00  --------------------------------------------------------  IN: 3290 mL / OUT: 1780 mL / NET: 1510 mL    10-22 @ 07:01  -  10-22 @ 11:49  --------------------------------------------------------  IN: 500 mL / OUT: 600 mL / NET: -100 mL        LABS:                        15.6   20.71 )-----------( 332      ( 22 Oct 2021 05:08 )             47.1     10-22    136  |  98  |  15.9  ----------------------------<  186<H>  4.1   |  22.0  |  0.58    Ca    8.9      22 Oct 2021 05:08  Phos  2.6     10-22  Mg     1.9     10-22    TPro  6.6  /  Alb  3.7  /  TBili  0.8  /  DBili  0.3  /  AST  74<H>  /  ALT  158<H>  /  AlkPhos  146<H>  10-22      ;p-BNP=    Urinalysis Basic - ( 22 Oct 2021 05:09 )    Color: Yellow / Appearance: Clear / S.010 / pH: x  Gluc: x / Ketone: Trace  / Bili: Negative / Urobili: Negative mg/dL   Blood: x / Protein: 100 mg/dL / Nitrite: Negative   Leuk Esterase: Negative / RBC: 3-5 /HPF / WBC 0-2   Sq Epi: x / Non Sq Epi: Few / Bacteria: x        INTERPRETATION OF TELEMETRY: Reviewed by me. Multifocal atrial tachycardia  ECG: Reviewed by me. ST, PRWP, NSST/T wave abnormalities     RADIOLOGY & ADDITIONAL STUDIES:    X-ray:  reviewed by me.   < from: Xray Chest 2 Views PA/Lat (10.14.21 @ 10:38) >  EXAM:  XR CHEST PA LAT 2V                          PROCEDURE DATE:  10/14/2021          INTERPRETATION:  Clinical history: 51-year-old male, preop.    Two expiratory views of the chest without comparison demonstrate a normal cardiac silhouette andnormal pulmonary vasculature with no consolidation, effusion or pneumothorax.    Moderate thoracic dextroscoliosis/degenerative change is noted.    Increased interstitial markings are secondary to poor inspiration in the absence of physical findings.    IMPRESSION:  No acute radiographic findings    < end of copied text >    CT scan:   < from: CT Head No Cont (10.20.21 @ 18:39) >  EXAM:  CT BRAIN                          PROCEDURE DATE:  10/20/2021          INTERPRETATION:  CLINICAL INDICATIONS:  f/u post op suboccipital craniectomy    COMPARISON: None.    TECHNIQUE: Noncontrast CT of the head. Multiplanar reformations are submitted.    FINDINGS: The patient is status post suboccipital craniectomy for decompression of Chiari 1 malformation. The extracranial/extradural postoperative air, drainage catheter, and fluid at the operative site. Posterior C1 arch has been resected.    There is no compelling evidence for an acute transcortical infarction. There is no evidence of mass, mass effect, midline shift or extra-axial fluid collection. The lateral ventricles and cortical sulci are age-appropriate in size and configuration. 1.7 cm mucous retention cyst in the right maxillary sinus. Tiny left mastoid air cell effusion. The orbits, right mastoid air cells and visualized paranasal sinuses are unremarkable. The calvarium is intact.    Metallic foreign body in the leftfrontal scalp on image 28 of series 2..    IMPRESSION: Status post cardiac malformation decompression. Expected postoperative changes.    < end of copied text >    MRI:     
HPI: 51 year old male hx COPD on Anoro ellipita/ inhaler use, MARCUS cpap use, DM, Diabetic neuropathy, HLD, HTN, appendectomy 1992 presents for elective suboccipital craniectomy and c1/c2 laminectomy for Chiari malformation with Dr. Araiza. He has history of neck pain and b/l hand weakness. He had outpatient MRI which confirmed Chiari malformation. Patient is admitted to the NSICU POD 0 suboccipital craniectomy, C1-C2 laminectomy.     REVIEW OF SYSTEMS:  Constitutional:  Pain well controlled with PRNs, no fevers, chills, or new weakness  Eyes: No double vision, no change in visual acuity, no blurry vision, no occular discharge  Neurologic: +hx HA/ neck pain, +hx Chiari malformation, +b/l hand weakness, No new weakness, no seizure reported, headaches well controlled with PRN medications  Ears, nose, mouth throat:  No ottorrhea. No change in hearing, No anosmia, No oral lesions, No sore throat  Cardiovascular: +HTN, +HLD, No palpitations, no chest pain, no nocturnal or positional dyspnea.  Respiratory: +hx MARCUS on CPAP, +hx COPD with inhaler use, No shortness of breath, no wheezing,  No Cough  Gastrointestinal: No change in bowel habits, no change in appetite  Genitourinary: No Frequency, No Dysuria, no Hematuria  Musculoskeletal: +hx b/l hand weakness, +hx herniated disc, No muscle wasting, No new weakness  Psych: No changes in mood, Denies drug use, Denies history of psyciatric illness  Integumentary: Denies new skin lesions  Endocrine: +HX DM,  denies history of thyroid disease, No heat or cold intolerance  Heme/Lymph: No use of antiplatelet/anticoagulant  Allergic / Immune: No active allergies unless otherwise specified in medical chart    All other systems reviewed and are negative      Vital Signs Last 24 Hrs  T(C): 36.2 (20 Oct 2021 06:37), Max: 36.2 (20 Oct 2021 06:37)  T(F): 97.1 (20 Oct 2021 06:37), Max: 97.1 (20 Oct 2021 06:37)  HR: 106 (20 Oct 2021 06:37) (106 - 106)  BP: 128/72 (20 Oct 2021 06:37) (128/72 - 128/72)  BP(mean): --  RR: 16 (20 Oct 2021 06:37) (16 - 16)  SpO2: 100% (20 Oct 2021 06:37) (100% - 100%)    PHYSICAL EXAM:  GENERAL: NAD  HEAD:  POD 0 suboccipital craniectomy, c1-c2 laminectomy. Subgaleal drain full suction  DRAINS: Subgaleal drain full suction  WOUND: Dressing clean dry intact  COLLETTE COMA SCORE: E- V- M- =15       E: 4= opens eyes spontaneously       V: 5= oriented       M: 6= follows commands   MENTAL STATUS: AAO x3; Awake; Opens eyes spontaneously; Appropriately conversant without aphasia; following simple commands  CRANIAL NERVES: Visual acuity normal for age, visual fields full to confrontation, PERRL. EOMI without nystagmus. Facial sensation intact V1-3 distribution b/l. Face symmetric w/ normal eye closure and smile, tongue midline. Hearing grossly intact. Speech clear.   REFLEXES: PERRL.  MOTOR: strength 5/5 b/l upper and lower extremities  Uppers     Delt (C5/6)     Bicep (C5/6)     Wrist Extend (C6)     Tricep (C7)     HG (C8/T1)  R                     5/5                 5/5                         5/5                           5/5                   5/5  L                      5/5                 5/5                         5/5                           5/5                   5/5  Lowers      HF(L1/L2)     KE (L3)     DF (L4)     EHL (L5)     PF (S1)      R                     5/5              5/5           5/5           5/5            5/5  L                     5/5               5/5          5/5            5/5            5/5  SENSATION: grossly intact to light touch all extremities  COORDINATION: Gait not assessed; rapid alternating movements intact; heel to shin intact; no upper extremity dysmetria  CHEST/LUNG: Clear to auscultation bilaterally; no rales, rhonchi, wheezing, or rubs  HEART: +S1/+S2; Regular rate and rhythm; no murmurs, rubs, or gallops  ABDOMEN: Soft, nontender, nondistended; bowel sounds present all four quadrants  EXTREMITIES:  2+ peripheral pulses, no clubbing, cyanosis, or edema  SKIN: Warm, dry; no rashes or lesions    LABS:                  RADIOLOGY & ADDITIONAL TESTS:          CAPRINI SCORE [CLOT]:  Patient has an estimated Caprini score of greater than 5.  However, the patient's unique clinical situation will be addressed in an individual manner to determine appropriate anticoagulation treatment, if any.
HPI  Patient is a 51 year old male with pmh of MARCUS non compliant with bipap, morbid obesity, dm2, hld and htn presents to [facility] 10/20 for planned surgery for a chiari malformation.  Patient is s.p Suboccipital craniectomy for Chiari malformation post op day #1. Patient seen at bedside and complains of 8 out of 10 neck pain. Medicine is being consulted for sinus tachycardia and med management.     PAST MEDICAL & SURGICAL HISTORY:  Obstructive sleep apnea use CPAP  Hypertension  Hyperlipidemia  Lumbar herniated disc  Diabetes mellitus  COPD (chronic obstructive pulmonary disease)      History of appendectomy      family history - father htn       REVIEW OF SYSTEMS    CONSTITUTIONAL: pain  EYES: No eye pain, visual disturbances, or discharge  ENMT:  No difficulty hearing, tinnitus, vertigo; No sinus or throat pain  NECK: pain or stiffness  BREASTS: No pain, masses, or nipple discharge  RESPIRATORY: No cough, wheezing, chills or hemoptysis; No shortness of breath  CARDIOVASCULAR: No chest pain, palpitations, dizziness, or leg swelling  GASTROINTESTINAL: No abdominal or epigastric pain. No nausea, vomiting, or hematemesis; No diarrhea or constipation.  GENITOURINARY: No dysuria, frequency, hematuria, or incontinence  NEUROLOGICAL: No headaches, memory loss, loss of strength, numbness, or tremors  SKIN: No itching, burning, rashes, or lesions   LYMPH NODES: No enlarged glands  ENDOCRINE: No heat or cold intolerance; No hair loss  MUSCULOSKELETAL: back pain   PSYCHIATRIC: No depression, anxiety, mood swings, or difficulty sleeping  HEME/LYMPH: No easy bruising, or bleeding gums  ALLERY AND IMMUNOLOGIC: No hives or eczema      MEDICATIONS  (STANDING):  atorvastatin 20 milliGRAM(s) Oral at bedtime  ceFAZolin   IVPB 2000 milliGRAM(s) IV Intermittent every 8 hours  dextrose 40% Gel 15 Gram(s) Oral once  dextrose 5%. 1000 milliLiter(s) (50 mL/Hr) IV Continuous <Continuous>  dextrose 5%. 1000 milliLiter(s) (100 mL/Hr) IV Continuous <Continuous>  dextrose 50% Injectable 25 Gram(s) IV Push once  dextrose 50% Injectable 12.5 Gram(s) IV Push once  dextrose 50% Injectable 25 Gram(s) IV Push once  gabapentin 400 milliGRAM(s) Oral three times a day  glucagon  Injectable 1 milliGRAM(s) IntraMuscular once  insulin lispro (ADMELOG) corrective regimen sliding scale   SubCutaneous Before meals and at bedtime  losartan 100 milliGRAM(s) Oral daily  methocarbamol 750 milliGRAM(s) Oral every 8 hours  multivitamin 1 Tablet(s) Oral daily  nicotine - 21 mG/24Hr(s) Patch 1 patch Transdermal daily  senna 2 Tablet(s) Oral at bedtime  tiotropium 18 MICROgram(s) Capsule 1 Capsule(s) Inhalation daily    MEDICATIONS  (PRN):  acetaminophen   Tablet .. 650 milliGRAM(s) Oral every 6 hours PRN Mild Pain (1 - 3)  ALBUTerol    90 MICROgram(s) HFA Inhaler 2 Puff(s) Inhalation every 6 hours PRN Bronchospasm  hydrALAZINE Injectable 10 milliGRAM(s) IV Push every 4 hours PRN SBP>150  HYDROmorphone  Injectable 0.5 milliGRAM(s) IV Push every 4 hours PRN Breakthrough pain  labetalol Injectable 10 milliGRAM(s) IV Push every 4 hours PRN Systolic blood pressure >150  metoprolol tartrate Injectable 5 milliGRAM(s) IV Push every 6 hours PRN SBP > 120  ondansetron Injectable 4 milliGRAM(s) IV Push every 6 hours PRN Nausea and/or Vomiting  oxyCODONE    IR 5 milliGRAM(s) Oral every 4 hours PRN Moderate Pain (4 - 6)  oxyCODONE    IR 10 milliGRAM(s) Oral every 4 hours PRN Severe Pain (7 - 10)      Allergies    penicillin (Unknown)    Vital Signs Last 24 Hrs  T(C): 36.5 (21 Oct 2021 16:00), Max: 37.1 (21 Oct 2021 00:00)  T(F): 97.7 (21 Oct 2021 16:00), Max: 98.8 (21 Oct 2021 00:00)  HR: 118 (21 Oct 2021 16:00) (105 - 127)  BP: 150/97 (21 Oct 2021 16:00) (99/78 - 150/97)  BP(mean): 111 (21 Oct 2021 16:00) (81 - 113)  RR: 28 (21 Oct 2021 16:00) (13 - 28)  SpO2: 91% (21 Oct 2021 16:00) (90% - 96%)    PHYSICAL EXAM:  GENERAL: obese, speaking in full sentences, slightly anxious   HEAD:  Atraumatic, Normocephalic  EYES: EOMI, PERRLA, conjunctiva and sclera clear  ENMT: No tonsillar erythema, exudates, or enlargement; Moist mucous mem  NECK: Supple, No JVD, Normal thyroid  NERVOUS SYSTEM:  Alert & Oriented X3, Good concentration;   CHEST/LUNG: Clear to percussion bilaterally; No rales, rhonchi, wheezing, or rubs  HEART: Regular rate and rhythm; No murmurs, rubs, or gallops  ABDOMEN: Soft, obese, distended   EXTREMITIES:  2+ Peripheral Pulses, No clubbing, cyanosis, or edema  LYMPH: No lymphadenopathy noted  SKIN: No rashes or lesions    LABS:                        15.3   20.79 )-----------( 366      ( 21 Oct 2021 03:46 )             47.5     10-    138  |  103  |  13.7  ----------------------------<  179<H>  4.3   |  23.0  |  0.74    Ca    8.5<L>      21 Oct 2021 03:46  Phos  4.0     10-  Mg     2.0     10-        Urinalysis Basic - ( 20 Oct 2021 20:11 )    Color: Yellow / Appearance: Clear / S.010 / pH: x  Gluc: x / Ketone: Negative  / Bili: Negative / Urobili: Negative mg/dL   Blood: x / Protein: 30 mg/dL / Nitrite: Negative   Leuk Esterase: Negative / RBC: 6-10 /HPF / WBC 0-2   Sq Epi: x / Non Sq Epi: Occasional / Bacteria: Occasional      RADIOLOGY & ADDITIONAL STUDIES:

## 2021-10-22 NOTE — PROGRESS NOTE ADULT - ASSESSMENT
51M hx COPD, MARCUS on cpap, DM with Diabetic neuropathy, HLD, HTN, s/p elective suboccipital craniectomy and c1/c2 laminectomy for Chiari malformation 10/20.  Sinus tachycardia, likely pain related.    Plan:  Neuro checks q2 hours  pain mgmt; increase muscle relaxants  CPAP at night, cont home meds  maintain -150  diet as tolerated  SCDs, eval for chemoppx in am  Doppler BL LE, TTE  Medicine involved; Cardiology recs recommendations pending  possible downgrad to SDU in pm

## 2021-10-22 NOTE — DIETITIAN INITIAL EVALUATION ADULT. - OTHER INFO
51 year old male PMH of COPD, DM, HLD, HTN, MARCUS now s/p from suboccipital crani with C1/C2 laminectomy for Chiari malformation. Pt reports good appetite/po intake prior to admission and currently. States he eats a variety of goods at home- states he often snacks throughout the day as well. Pt reports he gained ~100 lbs in the last year due to physical inactivity and difficulty walking. Provided pt with diet education (verbal/written literature). Assisted pt with menu selections. RD to follow up. 51 year old male PMH of COPD, DM, HLD, HTN, MARCUS now s/p from suboccipital crani with C1/C2 laminectomy for Chiari malformation. Pt reports good appetite/po intake prior to admission and currently. States he eats a variety of goods at home- states he often snacks throughout the day as well. Pt reports he gained ~100 lbs in the last year due to physical inactivity and difficulty walking. Provided pt with diet education (verbal/written literature). Assisted pt with menu selections. Pt reports allergy to fish/shellfish. RD to follow up.

## 2021-10-22 NOTE — CHART NOTE - NSCHARTNOTEFT_GEN_A_CORE
HPI:HPI: 51 year old male hx COPD on Anoro ellipita/ inhaler use, MARCUS cpap use, DM, Diabetic neuropathy, HLD, HTN, appendectomy 1992 presents for elective suboccipital craniectomy and c1/c2 laminectomy for Chiari malformation with Dr. Araiza. He has history of neck pain and b/l hand weakness. He had outpatient MRI which confirmed Chiari malformation. Patient is admitted to the NSICU POD 0 suboccipital craniectomy, C1-C2 laminectomy.     HOSPITAL COURSE:  51M hx COPD on Anoro ellipita/ inhaler use, MARCUS cpap use, DM, Diabetic neuropathy, HLD, HTN, appendectomy 1992 presents for elective suboccipital craniectomy and c1/c2 laminectomy for Chiari malformation with Dr. Araiza, now POD 2. Persistent sinus tachycardia post operative. Medicine consulted. Patient well hydrated with multiple IVF boluses and continued maintenance fluid. Pain controlled s/p subgaleal drain removal today. h/h stable. Patient denies dizziness with ambulation, dyspnea, chest pain, chest tightness, wheezing. EKG reviewed. Cardiology consulted. Patient pending TTE and Leg doppler today. Discussed with neurosurgeon Dr. Araiza and NSICU attending sandra Petit for downgrade to SDU for post operative monitoring and for continued workup for tachycardia    Vital Signs Last 24 Hrs  T(C): 36.4 (22 Oct 2021 08:00), Max: 37.3 (21 Oct 2021 23:46)  T(F): 97.6 (22 Oct 2021 08:00), Max: 99.1 (21 Oct 2021 23:46)  HR: 124 (22 Oct 2021 10:00) (103 - 130)  BP: 171/91 (22 Oct 2021 10:00) (118/77 - 171/91)  BP(mean): 110 (22 Oct 2021 10:00) (87 - 118)  RR: 25 (22 Oct 2021 10:00) (14 - 28)  SpO2: 91% (22 Oct 2021 10:00) (90% - 96%)    PHYSICAL EXAM:  GENERAL: NAD  HEAD:  Atraumatic, normocephalic. Post occipital craniectomy site with staples well approximated. No drainage from wound. S/p SG drain removal with suture in place  DRAINS: Subgaleal drain removed with suture in place.   WOUND: Dressing clean dry intact  COLLETTE COMA SCORE: E- V- M- =15       E: 4= opens eyes spontaneously        V: 5= oriented        M: 6= follows commands   MENTAL STATUS: AAO x3; Awake; Opens eyes spontaneously; Appropriately conversant without aphasia; following simple commands  CRANIAL NERVES: Visual acuity normal for age, visual fields full to confrontation, PERRL. EOMI without nystagmus. Facial sensation intact V1-3 distribution b/l. Face symmetric w/ normal eye closure and smile, tongue midline. Hearing grossly intact. Speech clear. Head turning and shoulder shrug intact.   REFLEXES: PERRL.   MOTOR: strength 5/5 b/l upper and lower extremities  Uppers     Delt (C5/6)     Bicep (C5/6)     Wrist Extend (C6)     Tricep (C7)     HG (C8/T1)  R                     5/5                 5/5                         5/5                           5/5                   5/5  L                      5/5                 5/5                         5/5                           5/5                   5/5  Lowers      HF(L1/L2)     KE (L3)     DF (L4)     EHL (L5)     PF (S1)      R                     5/5              5/5           5/5           5/5            5/5  L                     5/5               5/5          5/5            5/5            5/5  SENSATION: grossly intact to light touch all extremities  COORDINATION: Gait not assessed; rapid alternating movements intact; heel to shin intact; no upper extremity dysmetria      ASSESSMENT& PLAN:  51M hx COPD, MARCUS on cpap, DM with Diabetic neuropathy, HLD, HTN, s/p elective suboccipital craniectomy and c1/c2 laminectomy for Chiari malformation 10/20. POD 2. Subgaleal drain removed.   Persistent Sinus Tachycardia. Volume replaced with IVF boluses and maintenance fluid. H/H stable. Pain improved s/p subgaleal drain removed. Denies dyspnea, chest pain, chest tightness, abdominal pain.     Neuro:  -D/w neurosurgeon attending Dr. Araiza and NSICU attng Dr. Lucretia santiago for DG to SDU for post operative monitoring and continued work up for persistent tachycardia  -PRN Pain mgmt increased. Continue oxy/ PRN dilaudid for break through pain. Robaxin dose increased  -Gabapentin 400 TID  -PT/OT: recommend home with outpatient PT and rolling walker    CV:  -SBP goal 100-150  -Continue home dose losartan-PRN: antihypertensives ordered for SBP >150  -continue statin  -cards consulted, pending reccs  -pending TTE, Leg Dop    Respiratory:  -hx COPD, PRN Supplemental NC if 02sat <90%, continue inhalers  PRN: albuterol  -Nicotine patch for smoking cessation/ nicotine withdrawal    GI:  -CCD  -continue with BM regimen: senna/ miralax  -MVI per dietary recommendations    :  -net +1.5L  -Continue to monitor Scr    Heme:  -SCDS  -Start lovenox for DVT prophylaxis today 10/22    ID:  -Monitor for leukocytosis and fever    Endo:  -ISS    Lines& Skin:  -SG drain removed 10/22

## 2021-10-22 NOTE — CONSULT NOTE ADULT - ASSESSMENT
A/P: 52 y/o M active smoker with a PMHx of Obesity, HTN, HLD, MARCUS (on CPAP), COPD (on anoro ellipta), and Chiari malformation who presented to the hospital for a suboccipital craniectomy and duraplasty for decompression of Chiari malformation on 10/20/21. Patient is now POD #2, and overnight had some persistent tachycardia. Patient states that he has some palpitations at home, and has some dyspnea on exertion at baseline from COPD. Patient states that he is feeling ok now, but is requiring oxygen for hypoxia at this time. Patient denies any fevers, chills, CP, syncope, near syncope, abdominal pain, N/V/D, headache, or dizziness.     Multifocal Atrial Tachycardia  - In setting of post-operative state and hypoxia from COPD/MARCUS.   - IV fluids.   - Pain control.   - Start low dose diltiazem.   - LE venous duplex to r/o DVT.   - Consider CTA chest to r/o PE.   - Continue telemetry monitoring.     HTN  - Continue home losartan.     HLD  - Continue home statin.     Assessment and recommendations are final when note is signed by the attending. 
ASSESSMENT:  51M hx COPD on Anoro ellipita/ inhaler use, MARCUS cpap use, DM, Diabetic neuropathy, HLD, HTN, appendectomy 1992 presents for elective suboccipital craniectomy and c1/c2 laminectomy for Chiari malformation with Dr. Araiza.   POD 0.    PLAN:  Neuro:  -Admit to NSICU  -Neuro checks q1 hour  -stat CTH if any MS changes  -repeat CTH 6 hours  -per neurosurgery No keppra, no steroids  -SG drain to full suction.   -PRN: pain mgmt    CV:  [id number]  -PRN: labetalol/ hydralazine    Respiratory:  -hx COPD- Inhalers ordered  -supplemental NC PRN 02sat <89%  -PRN Duonebs for bronchospasm/ wheezing  -CPAP QHS    GI:  -NPO pending dysphagia     :  -Monitor SCr  -Continue IVF for hydration  -Remove Jones 10/21 AM  -send UA r/o infection    ID:  -Check UA  -Monitor for leukocytosis and fever  -Per neurosurg continue ancef x2 days    Heme:   -Hold Ap/AC at this time  -SCDS    Lines& Skin:  -SG to full suction per nsx
Patient is a 51 year old male with pmh of MARCUS non compliant with bipap, morbid obesity, dm2, hld and htn presents to [facility] 10/20 for planned surgery for a chiari malformation.  Patient is s.p Suboccipital craniectomy for Chiari malformation post op day #1    #Chiari malformation  -  - s.p craniectomy post op day #1  - plan as per neurosurgery   - please consider increasing pain meds     #sinus tachycardia  -likely secondary to pain   - had recent cardiac clearance as outpatient , likely not a cardiac issue  - maintain tele     #dm2 - c.w ssi   - a1c 8.3    #htn -  c.w losartan     #smoker - agree with nicotine patch     thank you for the consult , we will follow

## 2021-10-22 NOTE — DIETITIAN INITIAL EVALUATION ADULT. - PERTINENT LABORATORY DATA
10-22 Na136 mmol/L Glu 186 mg/dL<H> K+ 4.1 mmol/L Cr  0.58 mg/dL BUN 15.9 mg/dL Phos 2.6 mg/dL Alb 3.7 g/dL PAB n/a HgA1c 8.3%

## 2021-10-22 NOTE — PROGRESS NOTE ADULT - SUBJECTIVE AND OBJECTIVE BOX
52 y/o male w/ Hx of COPD, MARCUS cpap use, DM, M, HLD, HTN, admitted for elective suboccipital craniectomy and c1/c2 laminectomy for Chiari malformation with Dr. Araiza. He has history of neck pain and b/l hand weakness. He had outpatient MRI which confirmed Chiari malformation. Patient is admitted to the NSICU on 10/20 for suboccipital craniectomy, C1-C2 laminectomy.    Interval History:  24 Hour Events: Downgrade from NCCU cancelled due to persistent tachycardia.  NCCU attending not requesting further work up at this time and will re-evaluate patient in Am.  PO lopressor added to regimen    1. Patient's Neurologic Exam unchanged.    2. Patient's Hemodynamic's maintained without drips.  Persistent, unexplained tachycardia since surgery    3. Patient's Respiratory status is: adequate    4. Patient is pending: Patient is pending further neurologic recovery, further neurologic, respiratory, and hemodynamic monitoring & management in the ICU.     5. Dispo: ICU for now, downgrade when clinically stable.       Physical Exam:  Constitutional: NAD    Neuro  * Mental Status:  GCS 15:  E(4), V(5), M(6).  Awake, alert, oriented to conversation.  * Cranial Nerves: Cnii-Cnxii grossly intact. PERRL, EOMI, tongue midline, no gaze deviation  * Motor: RUE 5/5, LUE 5/5, RLE 5/5, LLE 5/5  * Sensory: Sensation intact to light touch  * Reflexes: Not assessed  * Gait: Not assessed  * Incision C/D/I, SG drain with bloody output     Vitals:  Vital Signs Last 24 Hrs  T(C): 37.3 (21 Oct 2021 23:46), Max: 37.3 (21 Oct 2021 23:46)  T(F): 99.1 (21 Oct 2021 23:46), Max: 99.1 (21 Oct 2021 23:46)  HR: 126 (22 Oct 2021 01:00) (106 - 130)  BP: 136/99 (22 Oct 2021 01:00) (111/78 - 152/86)  BP(mean): 109 (22 Oct 2021 01:00) (87 - 111)  RR: 22 (22 Oct 2021 01:00) (14 - 28)  SpO2: 95% (22 Oct 2021 01:00) (91% - 95%)    I&O:  I&O's Summary    20 Oct 2021 07:01  -  21 Oct 2021 07:00  --------------------------------------------------------  IN: 1825 mL / OUT: 3570 mL / NET: -1745 mL    21 Oct 2021 07:01  -  22 Oct 2021 05:22  --------------------------------------------------------  IN: 3040 mL / OUT: 1500 mL / NET: 1540 mL        Labs:                         15.6   20.71 )-----------( 332      ( 22 Oct 2021 05:08 )             47.1       10-21    138  |  103  |  13.7  ----------------------------<  179<H>  4.3   |  23.0  |  0.74    Ca    8.5<L>      21 Oct 2021 03:46  Phos  4.0     10-21  Mg     2.0     10-21              Neurosurgery Imaging:  10/20 CT Head without Contrast: anticipated postoperative changes.       Medications:  MEDICATIONS  (STANDING):  atorvastatin 20 milliGRAM(s) Oral at bedtime  dextrose 40% Gel 15 Gram(s) Oral once  dextrose 5%. 1000 milliLiter(s) (50 mL/Hr) IV Continuous <Continuous>  dextrose 5%. 1000 milliLiter(s) (100 mL/Hr) IV Continuous <Continuous>  dextrose 50% Injectable 25 Gram(s) IV Push once  dextrose 50% Injectable 12.5 Gram(s) IV Push once  dextrose 50% Injectable 25 Gram(s) IV Push once  gabapentin 400 milliGRAM(s) Oral three times a day  glucagon  Injectable 1 milliGRAM(s) IntraMuscular once  insulin lispro (ADMELOG) corrective regimen sliding scale   SubCutaneous Before meals and at bedtime  losartan 100 milliGRAM(s) Oral daily  methocarbamol 750 milliGRAM(s) Oral every 8 hours  metoprolol tartrate 25 milliGRAM(s) Oral two times a day  multivitamin 1 Tablet(s) Oral daily  nicotine - 21 mG/24Hr(s) Patch 1 patch Transdermal daily  senna 2 Tablet(s) Oral at bedtime  sodium chloride 0.9%. 500 milliLiter(s) (250 mL/Hr) IV Continuous <Continuous>  tiotropium 18 MICROgram(s) Capsule 1 Capsule(s) Inhalation daily    MEDICATIONS  (PRN):  acetaminophen   Tablet .. 650 milliGRAM(s) Oral every 6 hours PRN Mild Pain (1 - 3)  ALBUTerol    90 MICROgram(s) HFA Inhaler 2 Puff(s) Inhalation every 6 hours PRN Bronchospasm  hydrALAZINE Injectable 10 milliGRAM(s) IV Push every 4 hours PRN SBP>150  HYDROmorphone  Injectable 0.5 milliGRAM(s) IV Push every 4 hours PRN Breakthrough pain  labetalol Injectable 10 milliGRAM(s) IV Push every 4 hours PRN Systolic blood pressure >150  metoprolol tartrate Injectable 5 milliGRAM(s) IV Push every 6 hours PRN Heart Rate > 110 Sustained  ondansetron Injectable 4 milliGRAM(s) IV Push every 6 hours PRN Nausea and/or Vomiting  oxyCODONE    IR 10 milliGRAM(s) Oral every 4 hours PRN Moderate Pain (4 - 6)  oxyCODONE    IR 15 milliGRAM(s) Oral every 4 hours PRN Severe Pain (7 - 10)      Assessment:  51 year old male now POD 2 from suboccipital crani with C1/C2 laminectomy for chiari malformation.     Continue Neuro Checks  JUJU to full suction, monitor output  pain control PRN  Maintain BP within desired range  Continue diet  Medical care per Neuro ICU, to remain in NCCU pending re-evaluation in the AM by MD Boykin

## 2021-10-22 NOTE — PROGRESS NOTE ADULT - SUBJECTIVE AND OBJECTIVE BOX
CARRIE VILLANUEVA    [id number]    51y      Male    Patient is a 51y old  Male who presents with a chief complaint of Chiari malformation surgery (22 Oct 2021 10:04)      INTERVAL HPI/OVERNIGHT EVENTS:    patient has some headache, denies fever, chills, chest pain, nausea, vomiting, dizziness     REVIEW OF SYSTEMS:    CONSTITUTIONAL: No fever, some fatigue  RESPIRATORY: No cough, No shortness of breath  CARDIOVASCULAR: No chest pain, palpitations  GASTROINTESTINAL: No abdominal, No nausea, vomiting  NEUROLOGICAL: has headaches,  no loss of strength.  MISCELLANEOUS: No joint swelling or pain       Vital Signs Last 24 Hrs  T(C): 36.7 (22 Oct 2021 12:00), Max: 37.3 (21 Oct 2021 23:46)  T(F): 98.1 (22 Oct 2021 12:00), Max: 99.1 (21 Oct 2021 23:46)  HR: 124 (22 Oct 2021 10:00) (103 - 130)  BP: 171/91 (22 Oct 2021 10:00) (118/77 - 171/91)  BP(mean): 110 (22 Oct 2021 10:00) (87 - 118)  RR: 25 (22 Oct 2021 10:00) (14 - 28)  SpO2: 91% (22 Oct 2021 10:00) (90% - 96%)    PHYSICAL EXAM:    GENERAL: Middle age Obese male looking comfortable    HEENT: Surgical wound on the scalp  NECK: short thick neck   CHEST/LUNG: Clear to auscultate bilaterally; No wheezing  HEART: S1S2+, Regular rate and rhythm; No murmurs  ABDOMEN: Soft, Nontender, Nondistended; Bowel sounds present  EXTREMITIES:  2+ Peripheral Pulses, No edema  SKIN: No rashes or lesions  NEURO: AAOX3, no focal deficits  PSYCH: normal mood      LABS:                        15.6   20.71 )-----------( 332      ( 22 Oct 2021 05:08 )             47.1     10-    136  |  98  |  15.9  ----------------------------<  186<H>  4.1   |  22.0  |  0.58    Ca    8.9      22 Oct 2021 05:08  Phos  2.6     10-  Mg     1.9     10-    TPro  6.6  /  Alb  3.7  /  TBili  0.8  /  DBili  0.3  /  AST  74<H>  /  ALT  158<H>  /  AlkPhos  146<H>  10-22      Urinalysis Basic - ( 22 Oct 2021 05:09 )    Color: Yellow / Appearance: Clear / S.010 / pH: x  Gluc: x / Ketone: Trace  / Bili: Negative / Urobili: Negative mg/dL   Blood: x / Protein: 100 mg/dL / Nitrite: Negative   Leuk Esterase: Negative / RBC: 3-5 /HPF / WBC 0-2   Sq Epi: x / Non Sq Epi: Few / Bacteria: x          I&O's Summary    21 Oct 2021 07:  -  22 Oct 2021 07:00  --------------------------------------------------------  IN: 3290 mL / OUT: 1780 mL / NET: 1510 mL    22 Oct 2021 07:  -  22 Oct 2021 12:16  --------------------------------------------------------  IN: 500 mL / OUT: 600 mL / NET: -100 mL        MEDICATIONS  (STANDING):  atorvastatin 20 milliGRAM(s) Oral at bedtime  dextrose 40% Gel 15 Gram(s) Oral once  dextrose 5%. 1000 milliLiter(s) (50 mL/Hr) IV Continuous <Continuous>  dextrose 5%. 1000 milliLiter(s) (100 mL/Hr) IV Continuous <Continuous>  dextrose 50% Injectable 25 Gram(s) IV Push once  dextrose 50% Injectable 12.5 Gram(s) IV Push once  dextrose 50% Injectable 25 Gram(s) IV Push once  enoxaparin Injectable 40 milliGRAM(s) SubCutaneous at bedtime  gabapentin 400 milliGRAM(s) Oral three times a day  glucagon  Injectable 1 milliGRAM(s) IntraMuscular once  insulin lispro (ADMELOG) corrective regimen sliding scale   SubCutaneous Before meals and at bedtime  losartan 100 milliGRAM(s) Oral daily  methocarbamol 1500 milliGRAM(s) Oral every 8 hours  multivitamin 1 Tablet(s) Oral daily  nicotine -  14 mG/24Hr(s) Patch 1 patch Transdermal daily  polyethylene glycol 3350 17 Gram(s) Oral daily  senna 2 Tablet(s) Oral at bedtime  tiotropium 18 MICROgram(s) Capsule 1 Capsule(s) Inhalation daily    MEDICATIONS  (PRN):  acetaminophen   Tablet .. 650 milliGRAM(s) Oral every 6 hours PRN Mild Pain (1 - 3)  ALBUTerol    90 MICROgram(s) HFA Inhaler 2 Puff(s) Inhalation every 6 hours PRN Bronchospasm  hydrALAZINE Injectable 10 milliGRAM(s) IV Push every 4 hours PRN SBP>150  HYDROmorphone  Injectable 0.5 milliGRAM(s) IV Push every 4 hours PRN Breakthrough pain  labetalol Injectable 10 milliGRAM(s) IV Push every 4 hours PRN Systolic blood pressure >150  ondansetron Injectable 4 milliGRAM(s) IV Push every 6 hours PRN Nausea and/or Vomiting  oxyCODONE    IR 10 milliGRAM(s) Oral every 4 hours PRN Moderate Pain (4 - 6)  oxyCODONE    IR 15 milliGRAM(s) Oral every 4 hours PRN Severe Pain (7 - 10)

## 2021-10-22 NOTE — CONSULT NOTE ADULT - ATTENDING COMMENTS
chronic obstructive pulmonary disease: Sleep apnea.    Not on oxygen at home. Hypoxic on nasal canula  Has sleepa pnea., not on CPAP in hospital.   Check US venous.   Deep venous thrombosis prophylaxis: heparin or lovenox SQ.    probably MAT.  cardizem.  control pain.   if does not improve and US Venous negative, then may consider Ct chest with IV contrast for PE.  will follow up.
51M hx COPD, MARCUS on cpap, DM with Diabetic neuropathy, HLD, HTN, s/p elective suboccipital craniectomy and c1/c2 laminectomy for Chiari malformation 10/20.  Admit to NSICU, Neuro checks q1 hour  Post op CTh in 6 hrs  SG drain to full suction per NSGy; pain mgmt  CPAP at night, cont home meds  rest as above

## 2021-10-23 LAB
ANION GAP SERPL CALC-SCNC: 12 MMOL/L — SIGNIFICANT CHANGE UP (ref 5–17)
BUN SERPL-MCNC: 18 MG/DL — SIGNIFICANT CHANGE UP (ref 8–20)
CALCIUM SERPL-MCNC: 9.3 MG/DL — SIGNIFICANT CHANGE UP (ref 8.6–10.2)
CHLORIDE SERPL-SCNC: 100 MMOL/L — SIGNIFICANT CHANGE UP (ref 98–107)
CO2 SERPL-SCNC: 26 MMOL/L — SIGNIFICANT CHANGE UP (ref 22–29)
CREAT SERPL-MCNC: 0.63 MG/DL — SIGNIFICANT CHANGE UP (ref 0.5–1.3)
GLUCOSE BLDC GLUCOMTR-MCNC: 151 MG/DL — HIGH (ref 70–99)
GLUCOSE BLDC GLUCOMTR-MCNC: 153 MG/DL — HIGH (ref 70–99)
GLUCOSE BLDC GLUCOMTR-MCNC: 180 MG/DL — HIGH (ref 70–99)
GLUCOSE BLDC GLUCOMTR-MCNC: 230 MG/DL — HIGH (ref 70–99)
GLUCOSE SERPL-MCNC: 174 MG/DL — HIGH (ref 70–99)
HCT VFR BLD CALC: 45.6 % — SIGNIFICANT CHANGE UP (ref 39–50)
HGB BLD-MCNC: 15.1 G/DL — SIGNIFICANT CHANGE UP (ref 13–17)
MAGNESIUM SERPL-MCNC: 2 MG/DL — SIGNIFICANT CHANGE UP (ref 1.8–2.6)
MCHC RBC-ENTMCNC: 32.1 PG — SIGNIFICANT CHANGE UP (ref 27–34)
MCHC RBC-ENTMCNC: 33.1 GM/DL — SIGNIFICANT CHANGE UP (ref 32–36)
MCV RBC AUTO: 96.8 FL — SIGNIFICANT CHANGE UP (ref 80–100)
NT-PROBNP SERPL-SCNC: 93 PG/ML — SIGNIFICANT CHANGE UP (ref 0–300)
PHOSPHATE SERPL-MCNC: 3.6 MG/DL — SIGNIFICANT CHANGE UP (ref 2.4–4.7)
PLATELET # BLD AUTO: 335 K/UL — SIGNIFICANT CHANGE UP (ref 150–400)
POTASSIUM SERPL-MCNC: 4.7 MMOL/L — SIGNIFICANT CHANGE UP (ref 3.5–5.3)
POTASSIUM SERPL-SCNC: 4.7 MMOL/L — SIGNIFICANT CHANGE UP (ref 3.5–5.3)
RBC # BLD: 4.71 M/UL — SIGNIFICANT CHANGE UP (ref 4.2–5.8)
RBC # FLD: 12.1 % — SIGNIFICANT CHANGE UP (ref 10.3–14.5)
SODIUM SERPL-SCNC: 138 MMOL/L — SIGNIFICANT CHANGE UP (ref 135–145)
TSH SERPL-MCNC: 1.95 UIU/ML — SIGNIFICANT CHANGE UP (ref 0.27–4.2)
WBC # BLD: 18.19 K/UL — HIGH (ref 3.8–10.5)
WBC # FLD AUTO: 18.19 K/UL — HIGH (ref 3.8–10.5)

## 2021-10-23 PROCEDURE — 99232 SBSQ HOSP IP/OBS MODERATE 35: CPT

## 2021-10-23 PROCEDURE — 93306 TTE W/DOPPLER COMPLETE: CPT | Mod: 26

## 2021-10-23 PROCEDURE — 71275 CT ANGIOGRAPHY CHEST: CPT | Mod: 26

## 2021-10-23 RX ORDER — DILTIAZEM HCL 120 MG
180 CAPSULE, EXT RELEASE 24 HR ORAL DAILY
Refills: 0 | Status: DISCONTINUED | OUTPATIENT
Start: 2021-10-23 | End: 2021-10-24

## 2021-10-23 RX ADMIN — OXYCODONE HYDROCHLORIDE 15 MILLIGRAM(S): 5 TABLET ORAL at 21:58

## 2021-10-23 RX ADMIN — Medication 1 PATCH: at 07:46

## 2021-10-23 RX ADMIN — SENNA PLUS 2 TABLET(S): 8.6 TABLET ORAL at 21:43

## 2021-10-23 RX ADMIN — LOSARTAN POTASSIUM 100 MILLIGRAM(S): 100 TABLET, FILM COATED ORAL at 05:26

## 2021-10-23 RX ADMIN — POLYETHYLENE GLYCOL 3350 17 GRAM(S): 17 POWDER, FOR SOLUTION ORAL at 13:30

## 2021-10-23 RX ADMIN — OXYCODONE HYDROCHLORIDE 10 MILLIGRAM(S): 5 TABLET ORAL at 09:47

## 2021-10-23 RX ADMIN — GABAPENTIN 400 MILLIGRAM(S): 400 CAPSULE ORAL at 21:43

## 2021-10-23 RX ADMIN — Medication 4: at 13:45

## 2021-10-23 RX ADMIN — OXYCODONE HYDROCHLORIDE 10 MILLIGRAM(S): 5 TABLET ORAL at 05:27

## 2021-10-23 RX ADMIN — METHOCARBAMOL 1500 MILLIGRAM(S): 500 TABLET, FILM COATED ORAL at 05:26

## 2021-10-23 RX ADMIN — GABAPENTIN 400 MILLIGRAM(S): 400 CAPSULE ORAL at 05:26

## 2021-10-23 RX ADMIN — Medication 2: at 08:19

## 2021-10-23 RX ADMIN — Medication 2: at 21:59

## 2021-10-23 RX ADMIN — Medication 30 MILLIGRAM(S): at 05:27

## 2021-10-23 RX ADMIN — ENOXAPARIN SODIUM 40 MILLIGRAM(S): 100 INJECTION SUBCUTANEOUS at 21:44

## 2021-10-23 RX ADMIN — GABAPENTIN 400 MILLIGRAM(S): 400 CAPSULE ORAL at 13:36

## 2021-10-23 RX ADMIN — Medication 2: at 17:16

## 2021-10-23 RX ADMIN — Medication 180 MILLIGRAM(S): at 13:29

## 2021-10-23 RX ADMIN — Medication 1 TABLET(S): at 13:29

## 2021-10-23 RX ADMIN — Medication 1 PATCH: at 07:58

## 2021-10-23 RX ADMIN — OXYCODONE HYDROCHLORIDE 10 MILLIGRAM(S): 5 TABLET ORAL at 17:19

## 2021-10-23 RX ADMIN — METHOCARBAMOL 1500 MILLIGRAM(S): 500 TABLET, FILM COATED ORAL at 21:44

## 2021-10-23 RX ADMIN — OXYCODONE HYDROCHLORIDE 10 MILLIGRAM(S): 5 TABLET ORAL at 16:01

## 2021-10-23 RX ADMIN — ATORVASTATIN CALCIUM 20 MILLIGRAM(S): 80 TABLET, FILM COATED ORAL at 21:44

## 2021-10-23 RX ADMIN — METHOCARBAMOL 1500 MILLIGRAM(S): 500 TABLET, FILM COATED ORAL at 13:36

## 2021-10-23 RX ADMIN — OXYCODONE HYDROCHLORIDE 10 MILLIGRAM(S): 5 TABLET ORAL at 11:24

## 2021-10-23 NOTE — PROGRESS NOTE ADULT - ASSESSMENT
51y Male PMH Chiari I malformation, MARCUS, DM, HTN, HLD, COPD, admitted for elective suboccipital craniectomy POD#3.  - Doing well neurologically    PLAN:  - D/w Dr. Araiza  - Ok for Q4 neuro checks but keep in SDU  - On Tylenol, Robaxin, Oxy 5/10 PRN for pain  - On Neurontin 400 TID  - Cardiology following, changed Cardizem to 180 mg daily. Echo done, pending read. Will f/u further cardiology recs  - SBP goal 100-150. On Losartan 100 QD-- if BP affected from Cardizem can decrease losartan dose per cardiology  - Senna/Miralax for bowel regimen  - Hospitalist following, appreciated   - PT following- recommend home with outpatient PT  - OT eval pending  - Dispo pending cardiology optimization

## 2021-10-23 NOTE — PROGRESS NOTE ADULT - SUBJECTIVE AND OBJECTIVE BOX
[facility]  [address] [place] [id number]          SUBJECTIVE / OVERNIGHT EVENTS:    ROS:  CARDIAC: No cp sob or palp  RESP: No mucus production no uri symptoms  GI:  No melana no abd pain  EXTREMITIES:  no calf tenderness no edema    TELEMETRY:      MEDICATIONS  (STANDING):  atorvastatin 20 milliGRAM(s) Oral at bedtime  diltiazem    Tablet 30 milliGRAM(s) Oral every 8 hours  enoxaparin Injectable 40 milliGRAM(s) SubCutaneous at bedtime  gabapentin 400 milliGRAM(s) Oral three times a day  glucagon  Injectable 1 milliGRAM(s) IntraMuscular once  insulin lispro (ADMELOG) corrective regimen sliding scale   SubCutaneous Before meals and at bedtime  losartan 100 milliGRAM(s) Oral daily  methocarbamol 1500 milliGRAM(s) Oral every 8 hours  multivitamin 1 Tablet(s) Oral daily  nicotine -  14 mG/24Hr(s) Patch 1 patch Transdermal daily  polyethylene glycol 3350 17 Gram(s) Oral daily  senna 2 Tablet(s) Oral at bedtime  tiotropium 18 MICROgram(s) Capsule 1 Capsule(s) Inhalation daily    MEDICATIONS  (PRN):  acetaminophen   Tablet .. 650 milliGRAM(s) Oral every 6 hours PRN Mild Pain (1 - 3)  ALBUTerol    90 MICROgram(s) HFA Inhaler 2 Puff(s) Inhalation every 6 hours PRN Bronchospasm  hydrALAZINE Injectable 10 milliGRAM(s) IV Push every 4 hours PRN SBP>150  HYDROmorphone  Injectable 0.5 milliGRAM(s) IV Push every 4 hours PRN Breakthrough pain  labetalol Injectable 10 milliGRAM(s) IV Push every 4 hours PRN Systolic blood pressure >150  ondansetron Injectable 4 milliGRAM(s) IV Push every 6 hours PRN Nausea and/or Vomiting  oxyCODONE    IR 10 milliGRAM(s) Oral every 4 hours PRN Moderate Pain (4 - 6)  oxyCODONE    IR 15 milliGRAM(s) Oral every 4 hours PRN Severe Pain (7 - 10)    Vital Signs Last 24 Hrs  T(C): 36.3 (23 Oct 2021 08:00), Max: 36.9 (22 Oct 2021 17:26)  T(F): 97.4 (23 Oct 2021 08:00), Max: 98.4 (22 Oct 2021 17:26)  HR: 115 (23 Oct 2021 08:00) (104 - 126)  BP: 153/86 (23 Oct 2021 08:00) (103/50 - 171/91)  BP(mean): 99 (23 Oct 2021 08:00) (63 - 119)  RR: 18 (23 Oct 2021 08:00) (15 - 33)  SpO2: 93% (23 Oct 2021 08:00) (90% - 98%)  I&O's Summary    22 Oct 2021 07:01  -  23 Oct 2021 07:00  --------------------------------------------------------  IN: 1500 mL / OUT: 2125 mL / NET: -625 mL    23 Oct 2021 07:01  -  23 Oct 2021 09:49  --------------------------------------------------------  IN: 0 mL / OUT: 400 mL / NET: -400 mL        PHYSICAL EXAM:  GENERAL: NAD, well-developed  EYES: EOMI, PERRLA, conjunctiva and sclera clear  NECK:  No JVD  CHEST/LUNG: CTABL ; No wheeze  HEART: RRR; No murmurs  ABDOMEN: Soft, Nontender, Nondistended; Bowel sounds present  EXTREMITIES:  2+ Peripheral Pulses, No edema  PSYCH: AAOx3  NEUROLOGY: non-focal  SKIN: No rashes or lesions. No sacral ulcer    LABS:                        15.1   18.19 )-----------( 335      ( 23 Oct 2021 06:07 )             45.6     10-23    138  |  100  |  18.0  ----------------------------<  174<H>  4.7   |  26.0  |  0.63    Ca    9.3      23 Oct 2021 06:07  Phos  3.6     10-23  Mg     2.0     10-23    TPro  6.6  /  Alb  3.7  /  TBili  0.8  /  DBili  0.3  /  AST  74<H>  /  ALT  158<H>  /  AlkPhos  146<H>  10-          Urinalysis Basic - ( 22 Oct 2021 05:09 )    Color: Yellow / Appearance: Clear / S.010 / pH: x  Gluc: x / Ketone: Trace  / Bili: Negative / Urobili: Negative mg/dL   Blood: x / Protein: 100 mg/dL / Nitrite: Negative   Leuk Esterase: Negative / RBC: 3-5 /HPF / WBC 0-2   Sq Epi: x / Non Sq Epi: Few / Bacteria: x        Microbiology;        RADIOLOGY & ADDITIONAL TESTS:                     [facility]  [address] [place] [address]    SUBJECTIVE / OVERNIGHT EVENTS:  Feeling well  Cm  sinus to sinus tach  one episode of ST at 130 while sleeping    ROS:  CARDIAC: No cp sob or palp  RESP: No mucus production no uri symptoms  GI:  No melana no abd pain  EXTREMITIES:  no calf tenderness no edema    TELEMETRY:      MEDICATIONS  (STANDING):  atorvastatin 20 milliGRAM(s) Oral at bedtime  diltiazem    Tablet 30 milliGRAM(s) Oral every 8 hours  enoxaparin Injectable 40 milliGRAM(s) SubCutaneous at bedtime  gabapentin 400 milliGRAM(s) Oral three times a day  glucagon  Injectable 1 milliGRAM(s) IntraMuscular once  insulin lispro (ADMELOG) corrective regimen sliding scale   SubCutaneous Before meals and at bedtime  losartan 100 milliGRAM(s) Oral daily  methocarbamol 1500 milliGRAM(s) Oral every 8 hours  multivitamin 1 Tablet(s) Oral daily  nicotine -  14 mG/24Hr(s) Patch 1 patch Transdermal daily  polyethylene glycol 3350 17 Gram(s) Oral daily  senna 2 Tablet(s) Oral at bedtime  tiotropium 18 MICROgram(s) Capsule 1 Capsule(s) Inhalation daily    MEDICATIONS  (PRN):  acetaminophen   Tablet .. 650 milliGRAM(s) Oral every 6 hours PRN Mild Pain (1 - 3)  ALBUTerol    90 MICROgram(s) HFA Inhaler 2 Puff(s) Inhalation every 6 hours PRN Bronchospasm  hydrALAZINE Injectable 10 milliGRAM(s) IV Push every 4 hours PRN SBP>150  HYDROmorphone  Injectable 0.5 milliGRAM(s) IV Push every 4 hours PRN Breakthrough pain  labetalol Injectable 10 milliGRAM(s) IV Push every 4 hours PRN Systolic blood pressure >150  ondansetron Injectable 4 milliGRAM(s) IV Push every 6 hours PRN Nausea and/or Vomiting  oxyCODONE    IR 10 milliGRAM(s) Oral every 4 hours PRN Moderate Pain (4 - 6)  oxyCODONE    IR 15 milliGRAM(s) Oral every 4 hours PRN Severe Pain (7 - 10)    Vital Signs Last 24 Hrs  T(C): 36.3 (23 Oct 2021 08:00), Max: 36.9 (22 Oct 2021 17:26)  T(F): 97.4 (23 Oct 2021 08:00), Max: 98.4 (22 Oct 2021 17:26)  HR: 115 (23 Oct 2021 08:00) (104 - 126)  BP: 153/86 (23 Oct 2021 08:00) (103/50 - 171/91)  BP(mean): 99 (23 Oct 2021 08:00) (63 - 119)  RR: 18 (23 Oct 2021 08:00) (15 - 33)  SpO2: 93% (23 Oct 2021 08:00) (90% - 98%)  I&O's Summary    22 Oct 2021 07:01  -  23 Oct 2021 07:00  --------------------------------------------------------  IN: 1500 mL / OUT: 2125 mL / NET: -625 mL    23 Oct 2021 07:01  -  23 Oct 2021 09:49  --------------------------------------------------------  IN: 0 mL / OUT: 400 mL / NET: -400 mL    PHYSICAL EXAM:  GENERAL: obese in nad  EYES: EOMI, PERRLA, conjunctiva and sclera clear  NECK:  No JVD  CHEST/LUNG: CTABL ; No wheeze  HEART: RRR; No murmurs  ABDOMEN: Soft, Nontender, Nondistended; Bowel sounds present  EXTREMITIES:  2+ Peripheral Pulses, No edema  PSYCH: AAOx3  NEUROLOGY: non-focal  SKIN: No rashes or lesions. No sacral ulcer    LABS:                        15.1   18.19 )-----------( 335      ( 23 Oct 2021 06:07 )             45.6     10-23    138  |  100  |  18.0  ----------------------------<  174<H>  4.7   |  26.0  |  0.63    Ca    9.3      23 Oct 2021 06:07  Phos  3.6     10-23  Mg     2.0     10-    TPro  6.6  /  Alb  3.7  /  TBili  0.8  /  DBili  0.3  /  AST  74<H>  /  ALT  158<H>  /  AlkPhos  146<H>  10-    Urinalysis Basic - ( 22 Oct 2021 05:09 )    Color: Yellow / Appearance: Clear / S.010 / pH: x  Gluc: x / Ketone: Trace  / Bili: Negative / Urobili: Negative mg/dL   Blood: x / Protein: 100 mg/dL / Nitrite: Negative   Leuk Esterase: Negative / RBC: 3-5 /HPF / WBC 0-2   Sq Epi: x / Non Sq Epi: Few / Bacteria: x    Microbiology;

## 2021-10-23 NOTE — PROGRESS NOTE ADULT - ASSESSMENT
52 y/o M active smoker with a PMHx of Obesity, HTN, HLD, MARCUS (on CPAP), COPD (on anoro ellipta), and Chiari malformation who presented to the hospital for a suboccipital craniectomy and duraplasty for decompression of Chiari malformation on 10/20/21. Patient is now POD #2, and overnight had some persistent tachycardia. Patient states that he has some palpitations at home, and has some dyspnea on exertion at baseline from COPD. Patient states that he is feeling ok now, but is requiring oxygen for hypoxia at this time. Patient denies any fevers, chills, CP, syncope, near syncope, abdominal pain, N/V/D, headache, or dizziness.     Multifocal Atrial Tachycardia  - In setting of post-operative state and hypoxia from COPD/MARCUS.   - IV fluids.   - Pain control.   - Start low dose diltiazem.   - LE venous duplex to r/o DVT.   - Consider CTA chest to r/o PE.   - Continue telemetry monitoring.     HTN  - Continue home losartan.     HLD  - Continue home statin.         CARDIAC MEDS  atorvastatin 20 milliGRAM(s) Oral at bedtime  diltiazem    Tablet 30 milliGRAM(s) Oral every 8 hours  losartan 100 milliGRAM(s) Oral daily    52 y/o M active smoker with a PMHx of Obesity, HTN, HLD, MARCUS (on CPAP), COPD (on anoro ellipta), and Chiari malformation who presented to the hospital for a suboccipital craniectomy and duraplasty for decompression of Chiari malformation on 10/20/21. Patient is now POD #2, and overnight had some persistent tachycardia. Patient states that he has some palpitations at home, and has some dyspnea on exertion at baseline from COPD. Patient states that he is feeling ok now, but is requiring oxygen for hypoxia at this time. Patient denies any fevers, chills, CP, syncope, near syncope, abdominal pain, N/V/D, headache, or dizziness.     Multifocal Atrial Tachycardia/ Hx of copd  Now sinus tach  02 sat 94% on RA  change cardizme 30 q6h to 180 cd qd  venous doppler negative for dvt  Continue telemetry monitoring.   Echo is pending  further recommendations will be made after echo report    HTN  can decrease losartan if we need to increase ca channel blocker    HLD  C/W atorvastatin    CARDIAC MEDS  atorvastatin 20 milliGRAM(s) Oral at bedtime  diltiazem    Tablet 30 milliGRAM(s) Oral every 8 hours  losartan 100 milliGRAM(s) Oral daily

## 2021-10-23 NOTE — PROGRESS NOTE ADULT - SUBJECTIVE AND OBJECTIVE BOX
INTERVAL HPI/OVERNIGHT EVENTS:  51y Male PMH Chiari I malformation, MARCUS, DM, HTN, HLD, COPD, admitted for elective suboccipital craniectomy POD#3. Patient seen earlier this AM with Dr. Araiza. Reports resolved preoperative symptoms of headache and paresthesias. Denies dizziness, n/v. Voiding. No BM yet but reports he has difficulty going outside of his home. Requesting to be discharged when stable    Vital Signs Last 24 Hrs  T(C): 36.3 (23 Oct 2021 08:00), Max: 36.9 (22 Oct 2021 17:26)  T(F): 97.4 (23 Oct 2021 08:00), Max: 98.4 (22 Oct 2021 17:26)  HR: 108 (23 Oct 2021 11:23) (104 - 125)  BP: 133/84 (23 Oct 2021 11:23) (103/50 - 153/86)  BP(mean): 95 (23 Oct 2021 11:23) (63 - 119)  RR: 22 (23 Oct 2021 11:23) (15 - 27)  SpO2: 94% (23 Oct 2021 11:23) (90% - 98%)    PHYSICAL EXAM:  GENERAL: NAD, well-groomed, well-developed  HEAD: Suboccipital craniectomy site c/d/i  COLLETTE COMA SCORE: E- 4 V- 5 M- 6=15  MENTAL STATUS: AAO x3; Appropriately conversant without aphasia; following commands  CRANIAL NERVES: PERRL. EOMI without nystagmus. Facial sensation intact V1-3 distribution b/l. Face symmetric w/ normal eye closure and smile, tongue midline. Hearing grossly intact. Speech clear  MOTOR: strength 5/5 b/l upper and lower extremities  SENSATION: grossly intact to light touch all extremities  CHEST/LUNG: Nonlabored breathing, no respiratory distress  HEART: Tachycardic to low 100s at time of assessment    LABS:                        15.1   18.19 )-----------( 335      ( 23 Oct 2021 06:07 )             45.6     10    138  |  100  |  18.0  ----------------------------<  174<H>  4.7   |  26.0  |  0.63    Ca    9.3      23 Oct 2021 06:07  Phos  3.6     10-23  Mg     2.0     10-23    TPro  6.6  /  Alb  3.7  /  TBili  0.8  /  DBili  0.3  /  AST  74<H>  /  ALT  158<H>  /  AlkPhos  146<H>  10-22    Urinalysis Basic - ( 22 Oct 2021 05:09 )    Color: Yellow / Appearance: Clear / S.010 / pH: x  Gluc: x / Ketone: Trace  / Bili: Negative / Urobili: Negative mg/dL   Blood: x / Protein: 100 mg/dL / Nitrite: Negative   Leuk Esterase: Negative / RBC: 3-5 /HPF / WBC 0-2   Sq Epi: x / Non Sq Epi: Few / Bacteria: x    10-22 @ :01  -  10-23 @ 07:00  --------------------------------------------------------  IN: 1500 mL / OUT: 2125 mL / NET: -625 mL    10-23 @ :01  -  10-23 @ 12:16  --------------------------------------------------------  IN: 0 mL / OUT: 400 mL / NET: -400 mL    RADIOLOGY & ADDITIONAL TESTS:  US Duplex Venous Lower Ext Complete, Bilateral (10.22.21 @ 11:58)  IMPRESSION:  No evidence of deep venous thrombosis in either lower extremity.    CT Head No Cont (10.20.21 @ 18:39)  The patient is status post suboccipital craniectomy for decompression of Chiari 1 malformation. The extracranial/extradural postoperative air, drainage catheter, and fluid at the operative site. Posterior C1 arch has been resected.  There is no compelling evidence for an acute transcortical infarction. There is no evidence of mass, mass effect, midline shift or extra-axial fluid collection. The lateral ventricles and cortical sulci are age-appropriate in size and configuration. 1.7 cm mucous retention cyst in the right maxillary sinus. Tiny left mastoid air cell effusion. The orbits, right mastoid air cells and visualized paranasal sinuses are unremarkable. The calvarium is intact.  Metallic foreign body in the leftfrontal scalp on image 28 of series 2.

## 2021-10-23 NOTE — PROGRESS NOTE ADULT - ASSESSMENT
Patient is a 51 year old male with pmh of MARCUS non compliant with bipap, morbid obesity, dm2, hld and htn presents to [facility] 10/20 for planned surgery for a chiari malformation.  Patient is s/p Suboccipital craniectomy for Chiari malformation.     #Chiari malformation  -  - s.p craniectomy   - plan as per neurosurgery   - pain meds adjustment being done by Primary team     #sinus tachycardia:   -likely secondary to pain   - had recent cardiac clearance as outpatient , likely not a cardiac issue  - maintain tele   EKG with sinus tachycardia, TSH is ok, TTE is pending, cardiology is following   US doppler negative for DVT  Might need CT angio to r/o PE  need to wean off supplemental oxygen, I saw patient is sating 94% on RA.   has been started on Cardizem by cardiology.     #dm2 - c.w ssi   - a1c 8.3  will hold metformin       #htn -  c.w losartan     #smoker - agree with nicotine patch, counselled for cessation    Leucocytosis: Likely chronic, worse due to post op, no focus of infection, will monitor     Hospitalist team is going to follow along.

## 2021-10-23 NOTE — PROGRESS NOTE ADULT - SUBJECTIVE AND OBJECTIVE BOX
CARRIE VILLANUEVA    [id number]    51y      Male    Patient is a 51y old  Male who presents with a chief complaint of Chiari malformation surgery (22 Oct 2021 12:24)      INTERVAL HPI/OVERNIGHT EVENTS:    patient has no more  headache, denies fever, chills, chest pain, nausea, vomiting, dizziness     REVIEW OF SYSTEMS:    CONSTITUTIONAL: No fever, some fatigue  RESPIRATORY: No cough, No shortness of breath  CARDIOVASCULAR: No chest pain, palpitations  GASTROINTESTINAL: No abdominal, No nausea, vomiting  NEUROLOGICAL: has headaches,  no loss of strength.  MISCELLANEOUS: No joint swelling or pain        Vital Signs Last 24 Hrs  T(C): 36.3 (23 Oct 2021 08:00), Max: 36.9 (22 Oct 2021 17:26)  T(F): 97.4 (23 Oct 2021 08:00), Max: 98.4 (22 Oct 2021 17:26)  HR: 115 (23 Oct 2021 08:00) (104 - 126)  BP: 153/86 (23 Oct 2021 08:00) (103/50 - 153/86)  BP(mean): 99 (23 Oct 2021 08:00) (63 - 119)  RR: 18 (23 Oct 2021 08:00) (15 - 33)  SpO2: 93% (23 Oct 2021 08:00) (90% - 98%)    PHYSICAL EXAM:    GENERAL: Middle age Obese male looking comfortable    HEENT: Surgical wound on the scalp  NECK: short thick neck   CHEST/LUNG: Clear to auscultate bilaterally; No wheezing  HEART: S1S2+, Regular rate and rhythm; No murmurs  ABDOMEN: Soft, Nontender, Nondistended; Bowel sounds present  EXTREMITIES:  1+ Peripheral Pulses, No edema  SKIN: No rashes or lesions  NEURO: AAOX3, no focal deficits  PSYCH: normal mood      LABS:                        15.1   18.19 )-----------( 335      ( 23 Oct 2021 06:07 )             45.6     10-23    138  |  100  |  18.0  ----------------------------<  174<H>  4.7   |  26.0  |  0.63    Ca    9.3      23 Oct 2021 06:07  Phos  3.6     10-23  Mg     2.0     10-    TPro  6.6  /  Alb  3.7  /  TBili  0.8  /  DBili  0.3  /  AST  74<H>  /  ALT  158<H>  /  AlkPhos  146<H>  10-22      Urinalysis Basic - ( 22 Oct 2021 05:09 )    Color: Yellow / Appearance: Clear / S.010 / pH: x  Gluc: x / Ketone: Trace  / Bili: Negative / Urobili: Negative mg/dL   Blood: x / Protein: 100 mg/dL / Nitrite: Negative   Leuk Esterase: Negative / RBC: 3-5 /HPF / WBC 0-2   Sq Epi: x / Non Sq Epi: Few / Bacteria: x          I&O's Summary    22 Oct 2021 07:  -  23 Oct 2021 07:00  --------------------------------------------------------  IN: 1500 mL / OUT: 2125 mL / NET: -625 mL    23 Oct 2021 07:  -  23 Oct 2021 11:09  --------------------------------------------------------  IN: 0 mL / OUT: 400 mL / NET: -400 mL        MEDICATIONS  (STANDING):  atorvastatin 20 milliGRAM(s) Oral at bedtime  dextrose 40% Gel 15 Gram(s) Oral once  dextrose 5%. 1000 milliLiter(s) (100 mL/Hr) IV Continuous <Continuous>  dextrose 5%. 1000 milliLiter(s) (50 mL/Hr) IV Continuous <Continuous>  dextrose 50% Injectable 25 Gram(s) IV Push once  dextrose 50% Injectable 12.5 Gram(s) IV Push once  dextrose 50% Injectable 25 Gram(s) IV Push once  diltiazem    milliGRAM(s) Oral daily  enoxaparin Injectable 40 milliGRAM(s) SubCutaneous at bedtime  gabapentin 400 milliGRAM(s) Oral three times a day  glucagon  Injectable 1 milliGRAM(s) IntraMuscular once  insulin lispro (ADMELOG) corrective regimen sliding scale   SubCutaneous Before meals and at bedtime  losartan 100 milliGRAM(s) Oral daily  methocarbamol 1500 milliGRAM(s) Oral every 8 hours  multivitamin 1 Tablet(s) Oral daily  nicotine -  14 mG/24Hr(s) Patch 1 patch Transdermal daily  polyethylene glycol 3350 17 Gram(s) Oral daily  senna 2 Tablet(s) Oral at bedtime  tiotropium 18 MICROgram(s) Capsule 1 Capsule(s) Inhalation daily    MEDICATIONS  (PRN):  acetaminophen   Tablet .. 650 milliGRAM(s) Oral every 6 hours PRN Mild Pain (1 - 3)  ALBUTerol    90 MICROgram(s) HFA Inhaler 2 Puff(s) Inhalation every 6 hours PRN Bronchospasm  hydrALAZINE Injectable 10 milliGRAM(s) IV Push every 4 hours PRN SBP>150  HYDROmorphone  Injectable 0.5 milliGRAM(s) IV Push every 4 hours PRN Breakthrough pain  labetalol Injectable 10 milliGRAM(s) IV Push every 4 hours PRN Systolic blood pressure >150  ondansetron Injectable 4 milliGRAM(s) IV Push every 6 hours PRN Nausea and/or Vomiting  oxyCODONE    IR 10 milliGRAM(s) Oral every 4 hours PRN Moderate Pain (4 - 6)  oxyCODONE    IR 15 milliGRAM(s) Oral every 4 hours PRN Severe Pain (7 - 10)

## 2021-10-24 ENCOUNTER — TRANSCRIPTION ENCOUNTER (OUTPATIENT)
Age: 51
End: 2021-10-24

## 2021-10-24 VITALS
SYSTOLIC BLOOD PRESSURE: 128 MMHG | HEART RATE: 108 BPM | DIASTOLIC BLOOD PRESSURE: 76 MMHG | OXYGEN SATURATION: 95 % | TEMPERATURE: 98 F

## 2021-10-24 LAB
GLUCOSE BLDC GLUCOMTR-MCNC: 153 MG/DL — HIGH (ref 70–99)
GLUCOSE BLDC GLUCOMTR-MCNC: 156 MG/DL — HIGH (ref 70–99)

## 2021-10-24 PROCEDURE — 83880 ASSAY OF NATRIURETIC PEPTIDE: CPT

## 2021-10-24 PROCEDURE — 93306 TTE W/DOPPLER COMPLETE: CPT

## 2021-10-24 PROCEDURE — 84443 ASSAY THYROID STIM HORMONE: CPT

## 2021-10-24 PROCEDURE — 94640 AIRWAY INHALATION TREATMENT: CPT

## 2021-10-24 PROCEDURE — 83735 ASSAY OF MAGNESIUM: CPT

## 2021-10-24 PROCEDURE — 80048 BASIC METABOLIC PNL TOTAL CA: CPT

## 2021-10-24 PROCEDURE — 81001 URINALYSIS AUTO W/SCOPE: CPT

## 2021-10-24 PROCEDURE — 93970 EXTREMITY STUDY: CPT

## 2021-10-24 PROCEDURE — 99232 SBSQ HOSP IP/OBS MODERATE 35: CPT

## 2021-10-24 PROCEDURE — 70450 CT HEAD/BRAIN W/O DYE: CPT

## 2021-10-24 PROCEDURE — 86769 SARS-COV-2 COVID-19 ANTIBODY: CPT

## 2021-10-24 PROCEDURE — 84100 ASSAY OF PHOSPHORUS: CPT

## 2021-10-24 PROCEDURE — 36415 COLL VENOUS BLD VENIPUNCTURE: CPT

## 2021-10-24 PROCEDURE — 85027 COMPLETE CBC AUTOMATED: CPT

## 2021-10-24 PROCEDURE — 80076 HEPATIC FUNCTION PANEL: CPT

## 2021-10-24 PROCEDURE — 76000 FLUOROSCOPY <1 HR PHYS/QHP: CPT

## 2021-10-24 PROCEDURE — 82962 GLUCOSE BLOOD TEST: CPT

## 2021-10-24 PROCEDURE — 71275 CT ANGIOGRAPHY CHEST: CPT

## 2021-10-24 PROCEDURE — C1889: CPT

## 2021-10-24 PROCEDURE — 93005 ELECTROCARDIOGRAM TRACING: CPT

## 2021-10-24 RX ORDER — DILTIAZEM HCL 120 MG
1 CAPSULE, EXT RELEASE 24 HR ORAL
Qty: 30 | Refills: 0
Start: 2021-10-24 | End: 2021-11-22

## 2021-10-24 RX ORDER — ACETAMINOPHEN 500 MG
2 TABLET ORAL
Qty: 0 | Refills: 0 | DISCHARGE
Start: 2021-10-24

## 2021-10-24 RX ORDER — CYCLOBENZAPRINE HYDROCHLORIDE 10 MG/1
1 TABLET, FILM COATED ORAL
Qty: 7 | Refills: 0
Start: 2021-10-24 | End: 2021-10-30

## 2021-10-24 RX ORDER — MELOXICAM 15 MG/1
1 TABLET ORAL
Qty: 0 | Refills: 0 | DISCHARGE

## 2021-10-24 RX ORDER — OXYCODONE HYDROCHLORIDE 5 MG/1
1 TABLET ORAL
Qty: 20 | Refills: 0
Start: 2021-10-24 | End: 2021-10-28

## 2021-10-24 RX ORDER — CYCLOBENZAPRINE HYDROCHLORIDE 10 MG/1
1 TABLET, FILM COATED ORAL
Qty: 0 | Refills: 0 | DISCHARGE

## 2021-10-24 RX ORDER — NICOTINE POLACRILEX 2 MG
0 GUM BUCCAL
Qty: 0 | Refills: 0 | DISCHARGE
Start: 2021-10-24

## 2021-10-24 RX ADMIN — TIOTROPIUM BROMIDE 1 CAPSULE(S): 18 CAPSULE ORAL; RESPIRATORY (INHALATION) at 08:17

## 2021-10-24 RX ADMIN — Medication 1 TABLET(S): at 12:55

## 2021-10-24 RX ADMIN — Medication 180 MILLIGRAM(S): at 05:50

## 2021-10-24 RX ADMIN — GABAPENTIN 400 MILLIGRAM(S): 400 CAPSULE ORAL at 05:51

## 2021-10-24 RX ADMIN — LOSARTAN POTASSIUM 100 MILLIGRAM(S): 100 TABLET, FILM COATED ORAL at 05:50

## 2021-10-24 RX ADMIN — METHOCARBAMOL 1500 MILLIGRAM(S): 500 TABLET, FILM COATED ORAL at 05:50

## 2021-10-24 RX ADMIN — Medication 2: at 12:55

## 2021-10-24 RX ADMIN — Medication 2: at 08:33

## 2021-10-24 NOTE — PROGRESS NOTE ADULT - TIME BILLING
review of relevant history, clinical examination, review of data and images, discussion of treatment with the multidisciplinary team and any consultants involved in this patient’s care as well as family discussion.
review of relevant history, clinical examination, review of data and images, discussion of treatment with the multidisciplinary team and any consultants involved in this patient’s care as well as family discussion.
Sinu tachycardia
Sinus tachycardia MAT/ PAT

## 2021-10-24 NOTE — DISCHARGE NOTE NURSING/CASE MANAGEMENT/SOCIAL WORK - PATIENT PORTAL LINK FT
You can access the FollowMyHealth Patient Portal offered by [facility] by registering at the following website: [email or web address]. By joining [employer]’s FollowMyHealth portal, you will also be able to view your health information using other applications (apps) compatible with our system.

## 2021-10-24 NOTE — DISCHARGE NOTE PROVIDER - NSDCFUADDINST_GEN_ALL_CORE_FT
Please use a pulse oximeter at home to monitor your oxygen saturation. If your pulse ox remains below 92% for 5 minutes sustained, contact your physician or return to the emergency department. Please contact your PCP regarding instruction on how to use/record your oxygen saturation.     If you experience any new or worsening symptoms, please return to the emergency department and contact your physician. Please use a pulse oximeter at home to monitor your oxygen saturation. If your pulse ox remains below 92% for 5 minutes sustained, contact your physician or return to the emergency department. Please contact your PCP regarding instruction on how to use/record your oxygen saturation.     If you experience any new or worsening symptoms, please return to the emergency department and contact your physician.    You may shower on post-op day 5 but do not scrub your incision or use soap/shampoo on your incision.

## 2021-10-24 NOTE — DISCHARGE NOTE PROVIDER - CARE PROVIDER_API CALL
Nash Araiza)  Neurosurgery  [address] [place] [address]  Phone: [phone]  Fax: [phone]  Follow Up Time: 1 week

## 2021-10-24 NOTE — PROGRESS NOTE ADULT - SUBJECTIVE AND OBJECTIVE BOX
CARRIE VILLANUEVA    [id number]    51y      Male    Patient is a 51y old  Male who presents with a chief complaint of Chiari malformation surgery (22 Oct 2021 12:24)      INTERVAL HPI/OVERNIGHT EVENTS:    patient has no more  headache, denies fever, chills, chest pain, nausea, vomiting, dizziness, still have some sinus tachycardia, no palpitation.      REVIEW OF SYSTEMS:    CONSTITUTIONAL: No fever, some fatigue  RESPIRATORY: No cough, No shortness of breath  CARDIOVASCULAR: No chest pain, palpitations  GASTROINTESTINAL: No abdominal, No nausea, vomiting  NEUROLOGICAL: has headaches,  no loss of strength.  MISCELLANEOUS: No joint swelling or pain         Vital Signs Last 24 Hrs  T(C): 36.7 (24 Oct 2021 08:00), Max: 37.1 (23 Oct 2021 20:00)  T(F): 98.1 (24 Oct 2021 08:00), Max: 98.7 (23 Oct 2021 20:00)  HR: 92 (24 Oct 2021 08:00) (88 - 119)  BP: 131/79 (24 Oct 2021 08:00) (114/77 - 133/84)  BP(mean): 91 (24 Oct 2021 08:00) (80 - 95)  RR: 18 (24 Oct 2021 08:00) (14 - 22)  SpO2: 96% (24 Oct 2021 08:00) (90% - 96%)    PHYSICAL EXAM:    GENERAL: Middle age Obese male looking comfortable    HEENT: Surgical wound on the scalp  NECK: short thick neck   CHEST/LUNG: Clear to auscultate bilaterally; No wheezing  HEART: S1S2+, Regular rate and rhythm; No murmurs  ABDOMEN: Soft, Nontender, Nondistended; Bowel sounds present  EXTREMITIES:  1+ Peripheral Pulses, No edema  SKIN: No rashes or lesions  NEURO: AAOX3, no focal deficits  PSYCH: normal mood      LABS:                        15.1   18.19 )-----------( 335      ( 23 Oct 2021 06:07 )             45.6     10-23    138  |  100  |  18.0  ----------------------------<  174<H>  4.7   |  26.0  |  0.63    Ca    9.3      23 Oct 2021 06:07  Phos  3.6     10-23  Mg     2.0     10-23              I&O's Summary    23 Oct 2021 07:01  -  24 Oct 2021 07:00  --------------------------------------------------------  IN: 0 mL / OUT: 400 mL / NET: -400 mL        MEDICATIONS  (STANDING):  atorvastatin 20 milliGRAM(s) Oral at bedtime  dextrose 40% Gel 15 Gram(s) Oral once  dextrose 5%. 1000 milliLiter(s) (50 mL/Hr) IV Continuous <Continuous>  dextrose 5%. 1000 milliLiter(s) (100 mL/Hr) IV Continuous <Continuous>  dextrose 50% Injectable 25 Gram(s) IV Push once  dextrose 50% Injectable 12.5 Gram(s) IV Push once  dextrose 50% Injectable 25 Gram(s) IV Push once  diltiazem    milliGRAM(s) Oral daily  enoxaparin Injectable 40 milliGRAM(s) SubCutaneous at bedtime  gabapentin 400 milliGRAM(s) Oral three times a day  glucagon  Injectable 1 milliGRAM(s) IntraMuscular once  insulin lispro (ADMELOG) corrective regimen sliding scale   SubCutaneous Before meals and at bedtime  losartan 100 milliGRAM(s) Oral daily  multivitamin 1 Tablet(s) Oral daily  nicotine -  14 mG/24Hr(s) Patch 1 patch Transdermal daily  polyethylene glycol 3350 17 Gram(s) Oral daily  senna 2 Tablet(s) Oral at bedtime  tiotropium 18 MICROgram(s) Capsule 1 Capsule(s) Inhalation daily    MEDICATIONS  (PRN):  acetaminophen   Tablet .. 650 milliGRAM(s) Oral every 6 hours PRN Mild Pain (1 - 3)  ALBUTerol    90 MICROgram(s) HFA Inhaler 2 Puff(s) Inhalation every 6 hours PRN Bronchospasm  ondansetron Injectable 4 milliGRAM(s) IV Push every 6 hours PRN Nausea and/or Vomiting  oxyCODONE    IR 10 milliGRAM(s) Oral every 4 hours PRN Moderate Pain (4 - 6)  oxyCODONE    IR 15 milliGRAM(s) Oral every 4 hours PRN Severe Pain (7 - 10)

## 2021-10-24 NOTE — PROGRESS NOTE ADULT - ASSESSMENT
Patient is a 51 year old male with pmh of MARCUS non compliant with bipap, morbid obesity, dm2, hld and htn presents to [facility] 10/20 for planned surgery for a chiari malformation.  Patient is s/p Suboccipital craniectomy for Chiari malformation.     #Chiari malformation  -  - s.p craniectomy   - plan as per neurosurgery   - pain meds adjustment being done by Primary team     #sinus tachycardia:   -likely secondary to pain   - had recent cardiac clearance as outpatient , likely not a cardiac issue  - maintain tele   EKG with sinus tachycardia, TSH is ok, TTE is done showed Left ventricular ejection fraction, by visual estimation, is 60 to 65%, Normal global left ventricular systolic function, Spectral Doppler shows impaired relaxation pattern of left ventricular myocardial filling (Grade I diastolic dysfunction), Normal right ventricular size and function, Trivial tricuspid regurgitation is visualized. Adequate TR velocity was not obtained to accurately assess RVSP.  US doppler negative for DVT  CT angio done showed Nondiagnostic evaluation of the pulmonary arterial tree due to suboptimal opacification. No central pulmonary embolism suggested.  need to wean off supplemental oxygen, need 2 liter now, will provide IS as looks like he has atelectasis, has been started on Cardizem by cardiology.     #dm2 - c.w ssi   - a1c 8.3  will hold metformin     #htn -  c.w losartan     #smoker - agree with nicotine patch, counselled for cessation    Leucocytosis: Likely chronic, worse due to post op, no focus of infection, will monitor     Hospitalist team is going to follow along.    Patient is a 51 year old male with pmh of MARCUS non compliant with bipap, morbid obesity, dm2, hld and htn presents to [facility] 10/20 for planned surgery for a chiari malformation.  Patient is s/p Suboccipital craniectomy for Chiari malformation.     #Chiari malformation  -  - s.p craniectomy   - plan as per neurosurgery   - pain meds adjustment being done by Primary team     #sinus tachycardia:   -likely secondary to pain   - had recent cardiac clearance as outpatient , likely not a cardiac issue  - maintain tele   EKG with sinus tachycardia, TSH is ok, TTE is done showed Left ventricular ejection fraction, by visual estimation, is 60 to 65%, Normal global left ventricular systolic function, Spectral Doppler shows impaired relaxation pattern of left ventricular myocardial filling (Grade I diastolic dysfunction), Normal right ventricular size and function, Trivial tricuspid regurgitation is visualized. Adequate TR velocity was not obtained to accurately assess RVSP.  US doppler negative for DVT  CT angio done showed Nondiagnostic evaluation of the pulmonary arterial tree due to suboptimal opacification. No central pulmonary embolism suggested, patient has been weaned off from supplemental Oxygen   will provide IS as looks like he has atelectasis, has been started on Cardizem by cardiology.    #dm2 - c.w ssi   - a1c 8.3  will hold metformin     #htn -  c.w losartan     #smoker - agree with nicotine patch, counselled for cessation    Leucocytosis: Likely chronic, worse due to post op, no focus of infection, will monitor     patient is stable from the medicine point of view for discharge if ok from Neuro surgery and cardiology point of view     please have patient use incentive spirometer at home and need to have him get his CBC done in 1 week to see if there is resolution of leucocytosis.

## 2021-10-24 NOTE — DISCHARGE NOTE PROVIDER - NSDCMRMEDTOKEN_GEN_ALL_CORE_FT
acetaminophen 325 mg oral tablet: 2 tab(s) orally every 6 hours, As needed, Mild Pain (1 - 3)  Albuterol (Eqv-Proventil HFA) 90 mcg/inh inhalation aerosol: 2 puff(s) inhaled every 6 hours, As Needed  Anoro Ellipta 62.5 mcg-25 mcg/inh inhalation powder: 1 puff(s) inhaled once a day  cyclobenzaprine 10 mg oral tablet: 1 tab(s) orally once a day  gabapentin 400 mg oral capsule: 1 cap(s) orally 3 times a day  Lipitor 20 mg oral tablet: 1 tab(s) orally once a day  losartan 100 mg oral tablet: 1 tab(s) orally once a day  metFORMIN 500 mg oral tablet: 1 tab(s) orally 2 times a day  Mobic 15 mg oral tablet: 1 tab(s) orally once a day  Nicoderm C-Q Clear 14 mg/24 hr transdermal film, extended release:  transdermal    acetaminophen 325 mg oral tablet: 2 tab(s) orally every 6 hours, As needed, Mild Pain (1 - 3)  Albuterol (Eqv-Proventil HFA) 90 mcg/inh inhalation aerosol: 2 puff(s) inhaled every 6 hours, As Needed  Anoro Ellipta 62.5 mcg-25 mcg/inh inhalation powder: 1 puff(s) inhaled once a day  cyclobenzaprine 10 mg oral tablet: 1 tab(s) orally once a day  dilTIAZem 180 mg/24 hours oral capsule, extended release: 1 cap(s) orally once a day  gabapentin 400 mg oral capsule: 1 cap(s) orally 3 times a day  Lipitor 20 mg oral tablet: 1 tab(s) orally once a day  losartan 100 mg oral tablet: 1 tab(s) orally once a day  metFORMIN 500 mg oral tablet: 1 tab(s) orally 2 times a day  Mobic 15 mg oral tablet: 1 tab(s) orally once a day  Nicoderm C-Q Clear 14 mg/24 hr transdermal film, extended release:  transdermal   oxyCODONE 5 mg oral tablet: 1 tab(s) orally every 6 hours MDD:4 tablets  Pulse oximeter :    acetaminophen 325 mg oral tablet: 2 tab(s) orally every 6 hours, As needed, Mild Pain (1 - 3)  Albuterol (Eqv-Proventil HFA) 90 mcg/inh inhalation aerosol: 2 puff(s) inhaled every 6 hours, As Needed  Anoro Ellipta 62.5 mcg-25 mcg/inh inhalation powder: 1 puff(s) inhaled once a day  dilTIAZem 180 mg/24 hours oral capsule, extended release: 1 cap(s) orally once a day  gabapentin 400 mg oral capsule: 1 cap(s) orally 3 times a day  Lipitor 20 mg oral tablet: 1 tab(s) orally once a day  losartan 100 mg oral tablet: 1 tab(s) orally once a day  metFORMIN 500 mg oral tablet: 1 tab(s) orally 2 times a day  Nicoderm C-Q Clear 14 mg/24 hr transdermal film, extended release:  transdermal   oxyCODONE 5 mg oral tablet: 1 tab(s) orally every 6 hours MDD:4 tablets  Pulse oximeter :

## 2021-10-24 NOTE — PROGRESS NOTE ADULT - PROVIDER SPECIALTY LIST ADULT
Cardiology
Cardiology
Hospitalist
Neurosurgery
Neurosurgery
Hospitalist
Hospitalist
Neurosurgery
NSICU
NSICU

## 2021-10-24 NOTE — DISCHARGE NOTE PROVIDER - HOSPITAL COURSE
52 y/o male w/ Hx of COPD, MARCUS CPAP use, DM, M, HLD, HTN, admitted for elective suboccipital craniectomy and c1/c2 laminectomy for Chiari malformation with Dr. Araiza. He has history of neck pain and b/l hand weakness. He had outpatient MRI which confirmed Chiari malformation. Patient is admitted to the NSICU POD #0 for suboccipital craniectomy, C1-C2 laminectomy. Pt extubated post operatively, admitted to NSICU.     Course complicated by tachycardia and desaturation, upon which both cardiology and medicine were consulted. Cardiology started Cardizem for HR control and ruled out PA with a CTA chest. Cardiology cleared patient for discharge.     Medicine also cleared patient for discharge with recommendations to use incentive spirometer at home and a repeat CBC in 1 week to check for resolution of leukocytosis.    Patient seen and examined this morning on rounds with the neurosurgical team. Patient is stable and appropriate for discharge home.

## 2021-10-24 NOTE — DISCHARGE NOTE PROVIDER - NSDCFUADDAPPT_GEN_ALL_CORE_FT
Please schedule an appointment with your PCP as soon as you are able. Please have a repeat CBC from your PCP in 1 week to monitor white blood cell count.     Please schedule and appointment with your cardiologist as soon as you are able for heartrate and blood pressure management.     Please follow up with Dr. Araiza in his office in 1 week. At this visit, inquire as to when your incisional staples will be removed.

## 2021-10-24 NOTE — DISCHARGE NOTE PROVIDER - NSDCCPCAREPLAN_GEN_ALL_CORE_FT
PRINCIPAL DISCHARGE DIAGNOSIS  Diagnosis: Chiari I malformation  Assessment and Plan of Treatment:

## 2021-10-24 NOTE — PROGRESS NOTE ADULT - ASSESSMENT
52 y/o M active smoker with a PMHx of Obesity, HTN, HLD, MARCUS (on CPAP), COPD (on anoro ellipta), and Chiari malformation who presented to the hospital for a suboccipital craniectomy and duraplasty for decompression of Chiari malformation on 10/20/21. Patient is now POD #2, and overnight had some persistent tachycardia. Patient states that he has some palpitations at home, and has some dyspnea on exertion at baseline from COPD. Patient states that he is feeling ok now, but is requiring oxygen for hypoxia at this time. Patient denies any fevers, chills, CP, syncope, near syncope, abdominal pain, N/V/D, headache, or dizziness.     Multifocal Atrial Tachycardia/ Hx of copd  SR -   02 sat 94% on RA  change Cardizem 30 q6h to 180 cd qd  venous doppler negative for dvt  Continue telemetry monitoring  Echo - complete, EF 50-65%,     HTN  Stable 131/89    HLD  C/W atorvastatin    CARDIAC MEDS  atorvastatin 20 milliGRAM(s) Oral at bedtime  diltiazem    Tablet 30 milliGRAM(s) Oral every 8 hours  losartan 100 milliGRAM(s) Oral daily    Dispo:  Discharge planning

## 2021-10-24 NOTE — PROGRESS NOTE ADULT - SUBJECTIVE AND OBJECTIVE BOX
[facility]                                                        Office: [address] [address]                                                       Telephone: [phone]. Fax:[phone]                                                                             PROGRESS NOTE   Reason for follow up:                               Overnight: No new events.   Update:  CTA to r/o PE Impression:  Nondiagnostic evaluation of the pulmonary arterial tree due to suboptimal opacification. No central pulmonary embolism suggested.  Presumed right middle and lower lobe atelectasis. Pneumonia should be excluded on a clinical basis.  Subjective: "Just wondering how I am doing"   Complains of:  Nothing  Review of symptoms: Cardiac:  No chest pain. No dyspnea. No palpitations.  Respiratory: no cough. No dyspnea  Gastrointestinal: No diarrhea. No abdominal pain. No bleeding.     Past medical history: No updates.   Chronic conditions: PAST MEDICAL & SURGICAL HISTORY:  Obstructive sleep apnea  use CPAP  Hypertension  Hyperlipidemia  Lumbar herniated disc  Diabetes mellitus  COPD (chronic obstructive pulmonary disease)  History of appendectomy  1992  Vitals:  T(C): 36.7 (10-24-21 @ 08:00), Max: 37.1 (10-23-21 @ 20:00)  HR: 92 (10-24-21 @ 08:00) (88 - 119)  BP: 131/79 (10-24-21 @ 08:00) (114/77 - 131/79)  RR: 18 (10-24-21 @ 08:00) (14 - 22)  SpO2: 96% (10-24-21 @ 08:00) (90% - 96%)    I&O's Summary  23 Oct 2021 07:01  -  24 Oct 2021 07:00  --------------------------------------------------------  IN: 0 mL / OUT: 400 mL / NET: -400 mL  Weight (kg): 108.9 (10-24 @ 06:00)    PHYSICAL EXAM:  Appearance: Comfortable. No acute distress, obese  HEENT:  Head and neck:  Dry and intack suture line,  Normocephalic.  Normal oral mucosa, PERRL, Neck is supple. No JVD, No carotid bruit.   Neurologic: A & O x 3, no focal deficits. EOMI , Cranial nerves are intact.  Lymphatic: No cervical lymphadenopathy  Cardiovascular: Normal S1 S2, No murmur, rubs/gallops. No JVD, No edema  Respiratory: Lungs clear to auscultation, diminished bilaterally bases.    Gastrointestinal:  Soft, Non-tender, + BS  Lower Extremities: No edema  Psychiatry: Patient is calm. No agitation. Mood & affect appropriate  Skin: No rash, warm and dry    CURRENT MEDICATIONS:  diltiazem    milliGRAM(s) Oral daily  losartan 100 milliGRAM(s) Oral daily  tiotropium 18 MICROgram(s) Capsule  gabapentin  polyethylene glycol 3350  senna  atorvastatin  insulin lispro (ADMELOG) corrective regimen sliding scale  enoxaparin Injectable  multivitamin    LABS:	 	  CARDIAC MARKERS ( 23 Oct 2021 06:07 )  x     / x     / x     / x     / x      p-BNP 23 Oct 2021 06:07: 93 pg/mL                        15.1   18.19 )-----------( 335      ( 23 Oct 2021 06:07 )             45.6   10-23  138  |  100  |  18.0  ----------------------------<  174<H>  4.7   |  26.0  |  0.63  Ca    9.3      23 Oct 2021 06:07  Phos  3.6     10-23  Mg     2.0     10-23  proBNP: Serum Pro-Brain Natriuretic Peptide: 93 pg/mL (10-23 @ 06:07)  HgA1c: TSH: Thyroid Stimulating Hormone, Serum: 1.95 uIU/mL    TELEMETRY: Reviewed        CTA to r/o PE Impression:  Nondiagnostic evaluation of the pulmonary arterial tree due to suboptimal opacification. No central pulmonary embolism suggested.  Presumed right middle and lower lobe atelectasis. Pneumonia should be excluded on a clinical basis.      Echo:    Summary:   1. Left ventricular ejection fraction, by visual estimation, is 60 to 65%.   2. Normal global left ventricular systolic function.   3. Spectral Doppler shows impaired relaxation pattern of left ventricular myocardial filling (Grade I diastolic dysfunction).   4. Normal right ventricular size and function.   5. Trivial tricuspid regurgitation is visualized. Adequate TR velocity was not obtained to accurately assess RVSP.   6. There is no evidence of pericardial effusion.   7. There is a significant pericardial fat pad present.   8. Endocardial visualization was enhanced with intravenous echo contrast.

## 2021-10-24 NOTE — DISCHARGE NOTE PROVIDER - NSDCCPTREATMENT_GEN_ALL_CORE_FT
PRINCIPAL PROCEDURE  Procedure: Craniectomy, suboccipital, for Chiari malformation  Findings and Treatment:

## 2021-10-25 ENCOUNTER — APPOINTMENT (OUTPATIENT)
Dept: RHEUMATOLOGY | Facility: CLINIC | Age: 51
End: 2021-10-25

## 2021-10-28 LAB — GLUCOSE BLDC GLUCOMTR-MCNC: 180 MG/DL — HIGH (ref 70–99)

## 2021-11-02 ENCOUNTER — APPOINTMENT (OUTPATIENT)
Dept: CARDIOLOGY | Facility: CLINIC | Age: 51
End: 2021-11-02
Payer: MEDICAID

## 2021-11-02 VITALS
OXYGEN SATURATION: 95 % | SYSTOLIC BLOOD PRESSURE: 110 MMHG | WEIGHT: 246 LBS | HEART RATE: 88 BPM | DIASTOLIC BLOOD PRESSURE: 72 MMHG | BODY MASS INDEX: 43.59 KG/M2 | HEIGHT: 63 IN

## 2021-11-02 PROCEDURE — 99214 OFFICE O/P EST MOD 30 MIN: CPT

## 2021-11-02 RX ORDER — MELOXICAM 15 MG/1
15 TABLET ORAL DAILY
Refills: 0 | Status: DISCONTINUED | COMMUNITY
End: 2021-11-02

## 2021-11-02 NOTE — CARDIOLOGY SUMMARY
[de-identified] : 10/12/2021, NSR with possible LAE [de-identified] : 10/18/2021, LV EF 60% with no significant valvular disease.

## 2021-11-02 NOTE — REVIEW OF SYSTEMS
[Joint Pain] : joint pain [Joint Stiffness] : joint stiffness [Negative] : Gastrointestinal [FreeTextEntry5] : see HPI [FreeTextEntry6] : see HPI [de-identified] : see HPI

## 2021-11-02 NOTE — PHYSICAL EXAM
[Normal] : moves all extremities, no focal deficits, normal speech [de-identified] : ambulates slowly with cane.

## 2021-11-02 NOTE — HISTORY OF PRESENT ILLNESS
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 The surgical date is 10/20/2021\par \par There is no history of MI, CVA, CHF, or previous coronary intervention.\par  \par Patient does have chronic mild dyspnea on exertion, however he attributes this to his smoking and obesity. Not very active due to his neurological condition. There is no exertional chest pain or pressure. No palpitations. \par \par Current Health Status:\par Since seeing me last patient underwent his neurological surgery. Post-operatively he developed sinus tachycardia. No VTE was diagnosed. Patient started on Cardizem CD 180mg daily. Sinus tachycardia improved. Patient with no chest pain, SOB, or palpitations.

## 2021-11-02 NOTE — DISCUSSION/SUMMARY
[FreeTextEntry1] : 1. HTN: appears controlled. Continue losartan 100mg daily and Cardizem CD 180mg daily. Goal BP less than 130/80.\par \par 2. HLD: continue atorvastatin 20mg daily.\par \par 3. Sinus Tachycardia: improved on Cardizem CD 180mg daily. Structurally normal heart on echocardiogram, 10/18/2021. \par \par Follow up in 6 months.

## 2021-11-04 ENCOUNTER — APPOINTMENT (OUTPATIENT)
Dept: NEUROSURGERY | Facility: CLINIC | Age: 51
End: 2021-11-04
Payer: MEDICAID

## 2021-11-04 VITALS
DIASTOLIC BLOOD PRESSURE: 78 MMHG | TEMPERATURE: 98.1 F | WEIGHT: 246 LBS | SYSTOLIC BLOOD PRESSURE: 136 MMHG | HEIGHT: 63 IN | HEART RATE: 89 BPM | BODY MASS INDEX: 43.59 KG/M2 | OXYGEN SATURATION: 96 %

## 2021-11-04 PROCEDURE — 99024 POSTOP FOLLOW-UP VISIT: CPT

## 2021-11-04 NOTE — ASSESSMENT
[FreeTextEntry1] : Patient with above history and imaging. No neurological deficits. Incision clean, dry and intact. Staples removed. advised he can wash hair, pat dry incision area, no ointments, creams, or  oils to site. He should continue to decrease amount of cigarettes smoked. Will follow up in office at the 6 week post op joe.\par \par Plan:\par F/u in 2-4 weeks\par Patient knows to call the office if there are any new or worsening symptoms. \par All questions and concerns answered and addressed in detail to patient's complete satisfaction. Patient verbalized understanding and agreed to plan.\par \par   No

## 2021-11-04 NOTE — PHYSICAL EXAM
[General Appearance - Alert] : alert [General Appearance - In No Acute Distress] : in no acute distress [General Appearance - Well Nourished] : well nourished [Clean] : clean [Dry] : dry [Healing Well] : healing well [Intact] : intact [Sutures Intact] : closed with intact sutures [Staple Intact] : closed with intact staples [No Drainage] : without drainage [Normal Skin] : normal [Oriented To Time, Place, And Person] : oriented to person, place, and time [Impaired Insight] : insight and judgment were intact [Sensation Tactile Decrease] : light touch was intact [Sensation Pain / Temperature Decrease] : pain and temperature was intact [No Tenderness to Palpation] : no spine tenderness on palpation [Sclera] : the sclera and conjunctiva were normal [] : no respiratory distress [Involuntary Movements] : no involuntary movements were seen [Motor Tone] : muscle strength and tone were normal

## 2021-11-04 NOTE — REASON FOR VISIT
[de-identified] : Suboccipital craniectomy with C1 and C2 laminectomy for posterior fossa/upper cervical spinal cord  decompression. [de-identified] : 10/20/21

## 2021-11-04 NOTE — HISTORY OF PRESENT ILLNESS
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 He has a history of neck pain and bilateral hand weakness. MRI confirmed Chiari malformation. \par Currently he states he is feeling better. Headaches and neck pain has improved. He has feeling back in his finger tips. He denies denies numbness/tingling, issues swallowing,  incontinence, loss of strength, or muscle weakness, and imbalance. His balance is better, but continues to use his cane because of disc herniations in his lower back. He continues to smoke cigarettes but states he is down to 1 pack a day versus the 3 packs he was doing. Denies any drainage form incision, fever or chills. \par

## 2021-11-15 ENCOUNTER — RX RENEWAL (OUTPATIENT)
Age: 51
End: 2021-11-15

## 2021-11-29 ENCOUNTER — RX RENEWAL (OUTPATIENT)
Age: 51
End: 2021-11-29

## 2021-12-07 ENCOUNTER — APPOINTMENT (OUTPATIENT)
Dept: NEUROSURGERY | Facility: CLINIC | Age: 51
End: 2021-12-07
Payer: MEDICAID

## 2021-12-07 VITALS
BODY MASS INDEX: 43.59 KG/M2 | HEIGHT: 63 IN | RESPIRATION RATE: 18 BRPM | OXYGEN SATURATION: 94 % | HEART RATE: 100 BPM | WEIGHT: 246 LBS | SYSTOLIC BLOOD PRESSURE: 116 MMHG | DIASTOLIC BLOOD PRESSURE: 70 MMHG

## 2021-12-07 PROCEDURE — 99024 POSTOP FOLLOW-UP VISIT: CPT

## 2021-12-07 NOTE — REASON FOR VISIT
[de-identified] : Suboccipital craniectomy with C1 and C2 laminectomy for posterior fossa/upper cervical spinal cord  decompression. [de-identified] : 10/20/21

## 2021-12-07 NOTE — HISTORY OF PRESENT ILLNESS
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 He has a history of neck pain and bilateral hand weakness. MRI confirmed Chiari malformation. \par Patient is doing well from the neurosurgical perspective and endorses 80% improvement of symptoms. Headaches and neck pain has improved. He has feeling back in his finger tips. He denies denies numbness/tingling, issues swallowing,  incontinence, loss of strength, or muscle weakness, and imbalance. His balance is better, but continues to use his cane because of disc herniations in his lower back. Continues to ambulate with assisting of a cane. He continues to smoke cigarettes but states he is down to 10 cigarettes a day versus the 3 packs he was doing. Denies any drainage form incision, fever or chills. \par

## 2021-12-07 NOTE — ASSESSMENT
[FreeTextEntry1] : Patient with above history and imaging. No neurological deficits. Incision clean, dry and intact. Staples removed. advised he can wash hair, pat dry incision area, no ointments, creams, or  oils to site. He should continue to decrease amount of cigarettes smoked. Will follow up in office at the 6 week post op joe.\par \par Plan:\par MRI cervical spine w/wo contrast to assess improvement of syrinx of spinal cord at 3 month joe \par Patient knows to call the office if there are any new or worsening symptoms. \par All questions and concerns answered and addressed in detail to patient's complete satisfaction. Patient verbalized understanding and agreed to plan.\par \par  I, Dr. Nash Araiza, personally performed the evaluation and management (E/M) services for this patient.  That E/M includes assessment of the initial examination, assessing the pertinent medical and surgical history, and establishing the plan of care.  At the time of the visit, my ACP, Sandy Lamar, actively participated in the evaluation and management services for this patient to be followed going forward in accordance with the plan documented in the clinical note.\par \par \par

## 2021-12-07 NOTE — PHYSICAL EXAM
[General Appearance - Alert] : alert [General Appearance - In No Acute Distress] : in no acute distress [General Appearance - Well Nourished] : well nourished [Clean] : clean [Dry] : dry [Healing Well] : healing well [Intact] : intact [Sutures Intact] : closed with intact sutures [Staple Intact] : closed with intact staples [No Drainage] : without drainage [Normal Skin] : normal [Oriented To Time, Place, And Person] : oriented to person, place, and time [Impaired Insight] : insight and judgment were intact [Sensation Tactile Decrease] : light touch was intact [Sensation Pain / Temperature Decrease] : pain and temperature was intact [No Tenderness to Palpation] : no spine tenderness on palpation [Sclera] : the sclera and conjunctiva were normal [] : no respiratory distress [Involuntary Movements] : no involuntary movements were seen [Motor Tone] : muscle strength and tone were normal [FreeTextEntry1] : posterior neck

## 2022-01-18 ENCOUNTER — APPOINTMENT (OUTPATIENT)
Dept: NEUROSURGERY | Facility: CLINIC | Age: 52
End: 2022-01-18

## 2022-02-03 ENCOUNTER — APPOINTMENT (OUTPATIENT)
Dept: NEUROSURGERY | Facility: CLINIC | Age: 52
End: 2022-02-03

## 2022-02-24 ENCOUNTER — APPOINTMENT (OUTPATIENT)
Dept: NEUROSURGERY | Facility: CLINIC | Age: 52
End: 2022-02-24
Payer: MEDICAID

## 2022-02-24 VITALS
SYSTOLIC BLOOD PRESSURE: 124 MMHG | OXYGEN SATURATION: 97 % | HEART RATE: 94 BPM | HEIGHT: 63 IN | DIASTOLIC BLOOD PRESSURE: 81 MMHG | BODY MASS INDEX: 42.52 KG/M2 | WEIGHT: 240 LBS | TEMPERATURE: 98.1 F

## 2022-02-24 PROCEDURE — 99213 OFFICE O/P EST LOW 20 MIN: CPT

## 2022-02-24 NOTE — PHYSICAL EXAM
[General Appearance - Alert] : alert [General Appearance - In No Acute Distress] : in no acute distress [General Appearance - Well Nourished] : well nourished [Clean] : clean [Dry] : dry [Healing Well] : healing well [Intact] : intact [Sutures Intact] : closed with intact sutures [Staple Intact] : closed with intact staples [No Drainage] : without drainage [Normal Skin] : normal [FreeTextEntry1] : posterior neck  [Oriented To Time, Place, And Person] : oriented to person, place, and time [Impaired Insight] : insight and judgment were intact [Sensation Tactile Decrease] : light touch was intact [Sensation Pain / Temperature Decrease] : pain and temperature was intact [No Tenderness to Palpation] : no spine tenderness on palpation [Sclera] : the sclera and conjunctiva were normal [] : no respiratory distress [Involuntary Movements] : no involuntary movements were seen [Motor Tone] : muscle strength and tone were normal

## 2022-02-24 NOTE — HISTORY OF PRESENT ILLNESS
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 He has a history of neck pain and bilateral hand weakness. MRI confirmed Chiari malformation. \par Patient is doing well from the neurosurgical perspective and endorses 80% improvement of symptoms. Headaches and neck pain has improved. He has feeling back in his finger tips. He denies denies numbness/tingling, issues swallowing,  incontinence, loss of strength, or muscle weakness, and imbalance. His balance is better, but continues to use his cane because of disc herniations in his lower back. He states the back pain is worsening in nature. Continues to ambulate with assisting of a cane. He continues to smoke cigarettes but states he is down to 10 cigarettes a day versus the 3 packs he was doing. Denies any drainage form incision, fever or chills. \par He no longer is doing physical therapy or pain management.   \par He manages symptoms with cyclobenzaprine  TID.

## 2022-02-24 NOTE — ASSESSMENT
[FreeTextEntry1] : Patient with above history and imaging. No neurological deficits. Incision clean, dry and intact. Staples removed. advised he can wash hair, pat dry incision area, no ointments, creams, or  oils to site. He should continue to decrease amount of cigarettes smoked.\par \par Plan:\par MRI cervical spine w/wo contrast to assess improvement of syrinx of spinal cord now.\par MRI lumbar spine w/o contrast to assess disk herniations. \par Patient knows to call the office if there are any new or worsening symptoms. \par All questions and concerns answered and addressed in detail to patient's complete satisfaction. Patient verbalized understanding and agreed to plan.\par \par  I, Dr. Nash Araiza, personally performed the evaluation and management (E/M) services for this patient.  That E/M includes assessment of the initial examination, assessing the pertinent medical and surgical history, and establishing the plan of care.  At the time of the visit, my ACP, Sandy Lamar, actively participated in the evaluation and management services for this patient to be followed going forward in accordance with the plan documented in the clinical note.\par \par \par

## 2022-02-24 NOTE — REASON FOR VISIT
[de-identified] : Suboccipital craniectomy with C1 and C2 laminectomy for posterior fossa/upper cervical spinal cord  decompression. [de-identified] : 10/20/21

## 2022-02-25 RX ORDER — ACETAMINOPHEN 500 MG/1
500 TABLET, COATED ORAL
Qty: 180 | Refills: 0 | Status: ACTIVE | COMMUNITY
Start: 2022-02-25 | End: 1900-01-01

## 2022-03-14 ENCOUNTER — APPOINTMENT (OUTPATIENT)
Dept: NEUROSURGERY | Facility: CLINIC | Age: 52
End: 2022-03-14

## 2022-03-31 ENCOUNTER — APPOINTMENT (OUTPATIENT)
Dept: NEUROSURGERY | Facility: CLINIC | Age: 52
End: 2022-03-31

## 2022-04-03 NOTE — PROGRESS NOTE ADULT - ATTENDING COMMENTS
Urine drug screen is positive for THC//marijuana metabolites, I would not be able to prescribe tramadol in that case, need a repeat urine drug screen (pain management profile 1) in a month and then every 3 months. 
NSGY Attg:    see above    patient seen and examined    agree with exam and plan as above
NSGY Attg:    see above    patient seen and examined    per patient, bilateral UE numbness significantly improved  Hg 5/5 bilaterally    post-op CT reviewed    agree with plan as above
Patient was seen and examined at bedside and heart rates are better controlled   c/w Cardizem 180 mg po q daily and HR interval improvement, hypoxemia maybe secondary to underlying COPD and MARCUS, but not on home oxygen at home   CTA to rule out PE negative, Nondiagnostic evaluation of the pulmonary arterial tree due to suboptimal opacification. No central pulmonary embolism suggested. Presumed right middle and lower lobe atelectasis. Pneumonia should be excluded on a clinical basis; and thus stable for discharge home   TTE reviewed shows normal LVEF 60-65%    Thank you for allowing me to participate in care of your patient.   Please call as needed.  Neurosurgery team aware that patient can be discharged home     Katelyn López D.O. [facility]  Cardiology/Vascular Cardiology -[facility]   Telephone # [phone] .
NSGY Attg:    see above    patient seen and examined    agree with exam and plan as above
Patient was seen and examined at bedside and notes to be tachycardic in 130s   Sinus tachycardia on the monitor, and previously in PAT  increase Cardizem 180 mg po q daily and HR interval improvement, hypoxemia maybe secondary to underlying COPD and MARCUS, but not on home oxygen at home   consider CTA to rule out PE and if negative, stable for discharge home   TTE reviewed shows normal LVEF 60-65%    keep one more nighe possible d/c tomorrow     Katelyn López D.O. [facility]  Cardiology/Vascular Cardiology -[facility]   Telephone # [phone]

## 2022-04-14 ENCOUNTER — APPOINTMENT (OUTPATIENT)
Dept: NEUROSURGERY | Facility: CLINIC | Age: 52
End: 2022-04-14
Payer: MEDICAID

## 2022-04-14 VITALS
SYSTOLIC BLOOD PRESSURE: 126 MMHG | HEART RATE: 92 BPM | DIASTOLIC BLOOD PRESSURE: 78 MMHG | WEIGHT: 240 LBS | HEIGHT: 63 IN | OXYGEN SATURATION: 94 % | BODY MASS INDEX: 42.52 KG/M2 | TEMPERATURE: 98.2 F

## 2022-04-14 PROCEDURE — 99213 OFFICE O/P EST LOW 20 MIN: CPT

## 2022-04-14 NOTE — DATA REVIEWED
[de-identified] : DATE OF EXAM: 03/03/2022\par R. Phys. Name: Sandy Lamar\par R. Phys. Address: [address] [place] [address], [address]\par R. Phys. Phone: [phone]\par EXAM: MRI-LUMBAR SPINE NON CONTRAST\par \par HISTORY: M62.81 Muscle weakness R26.2 Difficulty Walking\par \par \par TECHNIQUE: Axial and sagittal multi-weighted images of the lumbar spine were\par obtained on a 3.0 Vee magnet.\par \par COMPARISON: 6/10/2021\par \par FINDINGS:\par CONUS: Terminates in normal position and demonstrates normal signal\par characteristics.\par CAUDA EQUINA: Unremarkable.\par OSSEOUS STRUCTURES: Vertebral bodies are normal height without fractures.\par ALIGNMENT: No scoliosis is present.\par PARASPINAL SOFT TISSUES: Paraspinal soft tissues are unremarkable.\par \par T12-L1: No disc protrusion. There is no neural foraminal narrowing. There is no\par central canal stenosis. There is no facet joint arthrosis.\par L1-L2: No disc protrusion. There is no neural foraminal narrowing. There is no\par central canal stenosis. There is no facet joint arthrosis.\par L2-L3: No disc protrusion. There is no neural foraminal narrowing. There is no\par central canal stenosis. There is no facet joint arthrosis.\par L3-L4: No disc protrusion. There is no neural foraminal narrowing. There is no\par central canal stenosis. There is bilateral facet joint arthrosis.\par L4-L5: No disc protrusion. There is no neural foraminal narrowing. There is no\par central canal stenosis. There is bilateral facet joint arthrosis.\par L5-S1: There is a 3 mm circumferential disc bulge. There is no neural foraminal\par narrowing. There is no central canal stenosis. There is bilateral facet joint\par arthrosis.\par \par There is a transitional lumbosacral vertebrae with partial lumbarization of the\par S1 vertebral body.\par \par IMPRESSION:\par \par \par Multilevel degenerative disc disease without significant spinal canal stenosis\par or neural foraminal narrowing.\par \par Transitional lumbosacral vertebrae with partial lumbarization of the S1\par vertebral body.\par \par No significant interval change as compared to the prior study.\par \par \par \par ICD 10 -\par \par Signed by: Zaira Manriquez MD\par Signed Date: 3/3/2022 10:26 PM EST\par \par SIGNED BY: Zaira Manriquez M.D., Ext. [id number] 03/03/2022 10:26 PM\par IMPRESSION:\par \par \par Multilevel degenerative disc disease without significant spinal canal stenosis\par or neural foraminal narrowing.\par \par Transitional lumbosacral vertebrae with partial lumbarization of the S1\par vertebral body. [de-identified] : DATE OF EXAM: 03/03/2022\par R. Phys. Name: Sandy Lamar\par R. Phys. Address: [address] [place] [address], [address]\par R. Phys. Phone: [phone]\par EXAM: MRI-CERVICAL SPINE NON CONTRAST\par \par HISTORY: M62.81 Muscle weakness\par \par \par TECHNIQUE: Axial and sagittal images multi-weighted sequences of the cervical\par spine were obtained on a 3.0 Vee magnet.\par \par COMPARISON: None.\par \par FINDINGS:\par POSTERIOR FOSSA AND BRAIN STEM: Unremarkable. 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 There\par is a laminectomy at C2.\par ALIGNMENT: No vertebral body listhesis is present.\par PARASPINAL SOFT TISSUES: Paraspinal soft tissues are unremarkable.\par THYROID:Thyroid is unremarkable.\par \par C2-C3: No disc protrusion. There is no neural foraminal narrowing. There is no\par central canal stenosis. There is no facet joint arthrosis.\par C3-C4: No disc protrusion. There is no neural foraminal narrowing. There is no\par central canal stenosis. There is no facet joint arthrosis.\par C4-C5: No disc protrusion. There is no neural foraminal narrowing. There is no\par central canal stenosis. There is no facet joint arthrosis.\par C5-C6: No disc protrusion. There is no neural foraminal narrowing. There is no\par central canal stenosis. There is no facet joint arthrosis.\par C6-C7: No disc protrusion. There is no neural foraminal narrowing. There is no\par central canal stenosis. There is no facet joint arthrosis.\par C7-T1: No disc protrusion. There is no neural foraminal narrowing. There is no\par central canal stenosis. 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

## 2022-04-14 NOTE — REASON FOR VISIT
[Follow-Up: _____] : a [unfilled] follow-up visit [de-identified] : Suboccipital craniectomy with C1 and C2 laminectomy for posterior fossa/upper cervical spinal cord  decompression. [de-identified] : 10/20/21

## 2022-04-14 NOTE — ASSESSMENT
[FreeTextEntry1] : Patient with above history and imaging. No neurological deficits. I have reviewed his MRI cervical spine which reveals a good posterior decompression with evidence of syrinx and MRI lumbar spine no acute findings.   He will continue with conservative measures. \par \par Plan:\par Follow up at the one year joe without any additional imaging.\par Continue physical therapy as directed.\par Weight management.\par Patient knows to call the office if there are any new or worsening symptoms. \par All questions and concerns answered and addressed in detail to patient's complete satisfaction. Patient verbalized understanding and agreed to plan.\par \par \par

## 2022-04-14 NOTE — HISTORY OF PRESENT ILLNESS
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 He has a history of neck pain and bilateral hand weakness. MRI confirmed Chiari malformation. \par Patient is doing well from the neurosurgical perspective and endorses 80% improvement of symptoms. Headaches and neck pain has improved. He has feeling back in his finger tips. He denies denies numbness/tingling, issues swallowing,  incontinence, loss of strength, or muscle weakness, and imbalance. His balance is better, but continues to use his cane because of disc herniations in his lower back. He states the back pain is worsening in nature. Continues to ambulate with assisting of a cane. He continues to smoke cigarettes but states he is down to 5 cigarettes a day versus the 3 packs he was doing. Denies any drainage form incision, fever or chills. \par He states his sister is a physical therapist and works with him with stretching. \par He manages symptoms with cyclobenzaprine  TID.

## 2022-04-28 ENCOUNTER — RX RENEWAL (OUTPATIENT)
Age: 52
End: 2022-04-28

## 2022-05-06 ENCOUNTER — RX RENEWAL (OUTPATIENT)
Age: 52
End: 2022-05-06

## 2022-05-06 RX ORDER — TIZANIDINE 4 MG/1
4 TABLET ORAL
Qty: 90 | Refills: 0 | Status: ACTIVE | COMMUNITY
Start: 2022-02-25 | End: 1900-01-01

## 2022-05-09 ENCOUNTER — TRANSCRIPTION ENCOUNTER (OUTPATIENT)
Age: 52
End: 2022-05-09

## 2022-05-10 ENCOUNTER — APPOINTMENT (OUTPATIENT)
Dept: CARDIOLOGY | Facility: CLINIC | Age: 52
End: 2022-05-10

## 2022-05-12 ENCOUNTER — RX RENEWAL (OUTPATIENT)
Age: 52
End: 2022-05-12

## 2022-06-11 ENCOUNTER — RX RENEWAL (OUTPATIENT)
Age: 52
End: 2022-06-11

## 2022-06-15 ENCOUNTER — APPOINTMENT (OUTPATIENT)
Dept: CARDIOLOGY | Facility: CLINIC | Age: 52
End: 2022-06-15

## 2022-08-09 ENCOUNTER — APPOINTMENT (OUTPATIENT)
Dept: ULTRASOUND IMAGING | Facility: CLINIC | Age: 52
End: 2022-08-09

## 2022-08-09 PROCEDURE — 93970 EXTREMITY STUDY: CPT

## 2022-08-11 ENCOUNTER — TRANSCRIPTION ENCOUNTER (OUTPATIENT)
Age: 52
End: 2022-08-11

## 2022-11-01 ENCOUNTER — RX RENEWAL (OUTPATIENT)
Age: 52
End: 2022-11-01

## 2022-11-01 ENCOUNTER — TRANSCRIPTION ENCOUNTER (OUTPATIENT)
Age: 52
End: 2022-11-01

## 2022-11-15 ENCOUNTER — APPOINTMENT (OUTPATIENT)
Dept: CARDIOLOGY | Facility: CLINIC | Age: 52
End: 2022-11-15

## 2022-11-23 NOTE — PHYSICAL THERAPY INITIAL EVALUATION ADULT - DIAGNOSIS, PT EVAL
Addended by: SEAN SPIVEY on: 11/23/2022 10:01 AM     Modules accepted: Orders    
decreased functional mobility

## 2022-11-23 NOTE — H&P PST ADULT - PROBLEM SELECTOR PROBLEM 8
Patient is roomed via telephone for a virtual visit.  Patient confirmed she is in the [facility] at the time of this appointment.  Patient understands that this visit is billable and agree to proceed with appointment.   R/O Hypertension

## 2022-12-12 ENCOUNTER — TRANSCRIPTION ENCOUNTER (OUTPATIENT)
Age: 52
End: 2022-12-12

## 2023-01-11 ENCOUNTER — TRANSCRIPTION ENCOUNTER (OUTPATIENT)
Age: 53
End: 2023-01-11

## 2023-01-16 ENCOUNTER — RX RENEWAL (OUTPATIENT)
Age: 53
End: 2023-01-16

## 2023-01-31 ENCOUNTER — APPOINTMENT (OUTPATIENT)
Dept: CARDIOLOGY | Facility: CLINIC | Age: 53
End: 2023-01-31
Payer: MEDICAID

## 2023-01-31 VITALS
OXYGEN SATURATION: 96 % | WEIGHT: 250 LBS | SYSTOLIC BLOOD PRESSURE: 116 MMHG | HEIGHT: 63 IN | DIASTOLIC BLOOD PRESSURE: 62 MMHG | HEART RATE: 101 BPM | BODY MASS INDEX: 44.3 KG/M2

## 2023-01-31 PROCEDURE — 99214 OFFICE O/P EST MOD 30 MIN: CPT | Mod: 25

## 2023-01-31 PROCEDURE — 93000 ELECTROCARDIOGRAM COMPLETE: CPT

## 2023-01-31 RX ORDER — METHOCARBAMOL 500 MG/1
500 TABLET, FILM COATED ORAL 4 TIMES DAILY
Qty: 120 | Refills: 0 | Status: DISCONTINUED | COMMUNITY
Start: 2023-01-11 | End: 2023-01-31

## 2023-01-31 RX ORDER — CELECOXIB 200 MG/1
200 CAPSULE ORAL
Qty: 60 | Refills: 0 | Status: DISCONTINUED | COMMUNITY
Start: 2022-05-08 | End: 2023-01-31

## 2023-01-31 RX ORDER — CELECOXIB 200 MG/1
200 CAPSULE ORAL TWICE DAILY
Qty: 60 | Refills: 0 | Status: DISCONTINUED | COMMUNITY
Start: 2022-04-14 | End: 2023-01-31

## 2023-01-31 RX ORDER — ATORVASTATIN CALCIUM 40 MG/1
40 TABLET, FILM COATED ORAL
Qty: 1 | Refills: 3 | Status: ACTIVE | COMMUNITY
Start: 1900-01-01 | End: 1900-01-01

## 2023-01-31 RX ORDER — TIZANIDINE 4 MG/1
4 TABLET ORAL
Qty: 90 | Refills: 1 | Status: DISCONTINUED | COMMUNITY
Start: 2022-05-08 | End: 2023-01-31

## 2023-01-31 RX ORDER — METHOCARBAMOL 500 MG/1
500 TABLET, FILM COATED ORAL 3 TIMES DAILY
Qty: 90 | Refills: 0 | Status: DISCONTINUED | COMMUNITY
Start: 2022-04-14 | End: 2023-01-31

## 2023-01-31 RX ORDER — TRAMADOL HYDROCHLORIDE 50 MG/1
50 TABLET, COATED ORAL
Qty: 30 | Refills: 0 | Status: DISCONTINUED | COMMUNITY
Start: 2021-09-28 | End: 2023-01-31

## 2023-01-31 RX ORDER — CELECOXIB 200 MG/1
200 CAPSULE ORAL
Qty: 60 | Refills: 0 | Status: DISCONTINUED | COMMUNITY
Start: 2022-11-01 | End: 2023-01-31

## 2023-01-31 RX ORDER — GABAPENTIN 400 MG/1
400 CAPSULE ORAL 3 TIMES DAILY
Refills: 0 | Status: DISCONTINUED | COMMUNITY
End: 2023-01-31

## 2023-01-31 RX ORDER — OXYCODONE 5 MG/1
5 TABLET ORAL
Qty: 20 | Refills: 0 | Status: DISCONTINUED | COMMUNITY
Start: 2021-10-24 | End: 2023-01-31

## 2023-01-31 RX ORDER — TIZANIDINE 4 MG/1
4 TABLET ORAL
Qty: 90 | Refills: 0 | Status: DISCONTINUED | COMMUNITY
Start: 2022-08-11 | End: 2023-01-31

## 2023-01-31 NOTE — REVIEW OF SYSTEMS
[Joint Pain] : joint pain [Joint Stiffness] : joint stiffness [Negative] : Gastrointestinal [FreeTextEntry5] : see HPI [FreeTextEntry6] : see HPI [de-identified] : see HPI

## 2023-01-31 NOTE — HISTORY OF PRESENT ILLNESS
[FreeTextEntry1] : Historical Perspective:\par 52 year old male with obesity, HTN, HLD, active smoker presents for a cardiac evaluation prior to surgery in order to attempt to correct basilar invagination and syrinx of the cervical and thoracic spinal cord. The surgical date is 10/20/2021\par \par There is no history of MI, CVA, CHF, or previous coronary intervention.\par  \par Patient does have chronic mild dyspnea on exertion, however he attributes this to his smoking and obesity. Not very active due to his neurological condition. There is no exertional chest pain or pressure. No palpitations. \par \par Patient underwent his neurological surgery. Post-operatively he developed sinus tachycardia. No VTE was diagnosed. Patient started on Cardizem CD 180mg daily. Sinus tachycardia improved. \par \par Current Health Status:\par Patient with no chest pain, SOB, or palpitations. No hospitalizations since seeing me last. Remains compliant with his medications and reports no adverse effects. Still smoking 10 cigarettes/day.\par

## 2023-01-31 NOTE — PHYSICAL EXAM
[Normal] : moves all extremities, no focal deficits, normal speech [de-identified] : ambulates slowly with cane. [de-identified] : trace pitting edema bilateral lower extremities.

## 2023-01-31 NOTE — DISCUSSION/SUMMARY
[FreeTextEntry1] : 1. HTN: appears controlled. Continue losartan 100mg daily, HCTZ 25mg daily and Cardizem CD 180mg daily. Goal BP less than 130/80. Smoking cessation and weight loss important.\par \par 2. HLD: reviewed labs from October 2022 with patient. LDL was 130. Recommend increasing atorvastatin 20mg daily-->40mg daily.\par \par 3. Sinus Tachycardia: improved on Cardizem CD 180mg daily. Structurally normal heart on echocardiogram, 10/18/2021. \par \par Follow up in 12 months. [EKG obtained to assist in diagnosis and management of assessed problem(s)] : EKG obtained to assist in diagnosis and management of assessed problem(s)

## 2023-01-31 NOTE — CARDIOLOGY SUMMARY
[de-identified] : 1/31/2023, NSR with right axis deviation, poor R wave progression. [de-identified] : 10/18/2021, LV EF 60% with no significant valvular disease.

## 2023-02-07 ENCOUNTER — APPOINTMENT (OUTPATIENT)
Dept: NEUROSURGERY | Facility: CLINIC | Age: 53
End: 2023-02-07

## 2023-02-14 ENCOUNTER — NON-APPOINTMENT (OUTPATIENT)
Age: 53
End: 2023-02-14

## 2023-03-28 ENCOUNTER — APPOINTMENT (OUTPATIENT)
Dept: NEUROSURGERY | Facility: CLINIC | Age: 53
End: 2023-03-28
Payer: MEDICAID

## 2023-03-28 VITALS
HEART RATE: 94 BPM | BODY MASS INDEX: 44.3 KG/M2 | WEIGHT: 250 LBS | DIASTOLIC BLOOD PRESSURE: 84 MMHG | HEIGHT: 63 IN | TEMPERATURE: 97.9 F | SYSTOLIC BLOOD PRESSURE: 142 MMHG | OXYGEN SATURATION: 94 %

## 2023-03-28 DIAGNOSIS — Q75.8 OTHER SPECIFIED CONGENITAL MALFORMATIONS OF SKULL AND FACE BONES: ICD-10-CM

## 2023-03-28 DIAGNOSIS — M54.50 LOW BACK PAIN, UNSPECIFIED: ICD-10-CM

## 2023-03-28 DIAGNOSIS — G95.0 SYRINGOMYELIA AND SYRINGOBULBIA: ICD-10-CM

## 2023-03-28 PROCEDURE — 99213 OFFICE O/P EST LOW 20 MIN: CPT

## 2023-03-28 NOTE — REASON FOR VISIT
[Follow-Up: _____] : a [unfilled] follow-up visit [de-identified] : Suboccipital craniectomy with C1 and C2 laminectomy for posterior fossa/upper cervical spinal cord  decompression. [de-identified] : 10/20/21

## 2023-03-28 NOTE — DATA REVIEWED
[de-identified] : DATE OF EXAM: 03/03/2022\par R. Phys. Name: Sandy Lamar\par R. Phys. Address: [address] [place] [address], [id number]\par R. Phys. Phone: [phone]\par EXAM: MRI-LUMBAR SPINE NON CONTRAST\par \par HISTORY: M62.81 Muscle weakness R26.2 Difficulty Walking\par \par \par TECHNIQUE: Axial and sagittal multi-weighted images of the lumbar spine were\par obtained on a 3.0 Vee magnet.\par \par COMPARISON: 6/10/2021\par \par FINDINGS:\par CONUS: Terminates in normal position and demonstrates normal signal\par characteristics.\par CAUDA EQUINA: Unremarkable.\par OSSEOUS STRUCTURES: Vertebral bodies are normal height without fractures.\par ALIGNMENT: No scoliosis is present.\par PARASPINAL SOFT TISSUES: Paraspinal soft tissues are unremarkable.\par \par T12-L1: No disc protrusion. There is no neural foraminal narrowing. There is no\par central canal stenosis. There is no facet joint arthrosis.\par L1-L2: No disc protrusion. There is no neural foraminal narrowing. There is no\par central canal stenosis. There is no facet joint arthrosis.\par L2-L3: No disc protrusion. There is no neural foraminal narrowing. There is no\par central canal stenosis. There is no facet joint arthrosis.\par L3-L4: No disc protrusion. There is no neural foraminal narrowing. There is no\par central canal stenosis. There is bilateral facet joint arthrosis.\par L4-L5: No disc protrusion. There is no neural foraminal narrowing. There is no\par central canal stenosis. There is bilateral facet joint arthrosis.\par L5-S1: There is a 3 mm circumferential disc bulge. There is no neural foraminal\par narrowing. There is no central canal stenosis. There is bilateral facet joint\par arthrosis.\par \par There is a transitional lumbosacral vertebrae with partial lumbarization of the\par S1 vertebral body.\par \par IMPRESSION:\par \par \par Multilevel degenerative disc disease without significant spinal canal stenosis\par or neural foraminal narrowing.\par \par Transitional lumbosacral vertebrae with partial lumbarization of the S1\par vertebral body.\par \par No significant interval change as compared to the prior study.\par \par \par \par ICD 10 -\par \par Signed by: Zaira Manriquez MD\par Signed Date: 3/3/2022 10:26 PM EST\par \par SIGNED BY: Zaira Manriquez M.D., Ext. [id number] 03/03/2022 10:26 PM\par IMPRESSION:\par \par \par Multilevel degenerative disc disease without significant spinal canal stenosis\par or neural foraminal narrowing.\par \par Transitional lumbosacral vertebrae with partial lumbarization of the S1\par vertebral body. [de-identified] : DATE OF EXAM: 03/03/2022\par R. Phys. Name: Sandy Lamar\par R. Phys. Address: [address] [place] [address], [address]\par R. Phys. Phone: [phone]\par EXAM: MRI-CERVICAL SPINE NON CONTRAST\par \par HISTORY: M62.81 Muscle weakness\par \par \par TECHNIQUE: Axial and sagittal images multi-weighted sequences of the cervical\par spine were obtained on a 3.0 Vee magnet.\par \par COMPARISON: None.\par \par FINDINGS:\par POSTERIOR FOSSA AND BRAIN STEM: Unremarkable. 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 There\par is a laminectomy at C2.\par ALIGNMENT: No vertebral body listhesis is present.\par PARASPINAL SOFT TISSUES: Paraspinal soft tissues are unremarkable.\par THYROID:Thyroid is unremarkable.\par \par C2-C3: No disc protrusion. There is no neural foraminal narrowing. There is no\par central canal stenosis. There is no facet joint arthrosis.\par C3-C4: No disc protrusion. There is no neural foraminal narrowing. There is no\par central canal stenosis. There is no facet joint arthrosis.\par C4-C5: No disc protrusion. There is no neural foraminal narrowing. There is no\par central canal stenosis. There is no facet joint arthrosis.\par C5-C6: No disc protrusion. There is no neural foraminal narrowing. There is no\par central canal stenosis. There is no facet joint arthrosis.\par C6-C7: No disc protrusion. There is no neural foraminal narrowing. There is no\par central canal stenosis. There is no facet joint arthrosis.\par C7-T1: No disc protrusion. There is no neural foraminal narrowing. There is no\par central canal stenosis. 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

## 2023-03-28 NOTE — HISTORY OF PRESENT ILLNESS
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 He has a history of neck pain and bilateral hand weakness. MRI confirmed Chiari malformation. \par \par Interval history:\par \par Patient is doing well. Last seen 2022.  He endorses there is worsening of his symptoms from  the neurosurgical perspective and symptoms have regressed.  Headaches and neck pain has returned.  He has feeling back in his finger tips. He denies denies numbness/tingling, issues swallowing,  incontinence, loss of strength, or muscle weakness, and imbalance. His balance is better, but continues to use his cane because of disc herniations in his lower back. He states the back pain is worsening in nature. Continues to ambulate with assisting of a cane. He continues to smoke cigarettes but states he is down to 5 cigarettes a day versus the 3 packs he was doing. Denies any drainage form incision, fever or chills. \par He states his sister is a physical therapist and works with him with stretching. \par He manages symptoms with cyclobenzaprine  TID.\par A1C 7.4\par

## 2023-03-28 NOTE — ASSESSMENT
[FreeTextEntry1] : Patient with above history and imaging. No neurological deficits. Patient has worsening neck and UE paresthesias and lower back and RLE weakness. \par Plan:\par MRI cervical spine to assess syrinx and posterior decompression. \par MRI lumbar spine to assess RLE pain. \par Continue physical therapy as directed.\par Weight management.\par Patient knows to call the office if there are any new or worsening symptoms. \par All questions and concerns answered and addressed in detail to patient's complete satisfaction. Patient verbalized understanding and agreed to plan.\par \par \par

## 2023-04-07 ENCOUNTER — APPOINTMENT (OUTPATIENT)
Dept: MRI IMAGING | Facility: CLINIC | Age: 53
End: 2023-04-07
Payer: MEDICAID

## 2023-04-07 PROCEDURE — 72141 MRI NECK SPINE W/O DYE: CPT

## 2023-04-07 PROCEDURE — 72148 MRI LUMBAR SPINE W/O DYE: CPT

## 2023-04-13 ENCOUNTER — NON-APPOINTMENT (OUTPATIENT)
Age: 53
End: 2023-04-13

## 2023-04-27 ENCOUNTER — TRANSCRIPTION ENCOUNTER (OUTPATIENT)
Age: 53
End: 2023-04-27

## 2023-05-10 ENCOUNTER — TRANSCRIPTION ENCOUNTER (OUTPATIENT)
Age: 53
End: 2023-05-10

## 2023-05-18 NOTE — BRIEF OPERATIVE NOTE - NSICDXBRIEFPREOP_GEN_ALL_CORE_FT
PRE-OP DIAGNOSIS:  Chiari I malformation 20-Oct-2021 13:33:21  Mehdi Bradshaw  
Libtayo Counseling- I discussed with the patient the risks of Libtayo including but not limited to nausea, vomiting, diarrhea, and bone or muscle pain.  The patient verbalized understanding of the proper use and possible adverse effects of Libtayo.  All of the patient's questions and concerns were addressed.

## 2023-08-28 ENCOUNTER — APPOINTMENT (OUTPATIENT)
Dept: ENDOCRINOLOGY | Facility: CLINIC | Age: 53
End: 2023-08-28

## 2023-09-20 ENCOUNTER — TRANSCRIPTION ENCOUNTER (OUTPATIENT)
Age: 53
End: 2023-09-20

## 2023-09-21 ENCOUNTER — TRANSCRIPTION ENCOUNTER (OUTPATIENT)
Age: 53
End: 2023-09-21

## 2023-09-22 ENCOUNTER — TRANSCRIPTION ENCOUNTER (OUTPATIENT)
Age: 53
End: 2023-09-22

## 2023-09-25 ENCOUNTER — TRANSCRIPTION ENCOUNTER (OUTPATIENT)
Age: 53
End: 2023-09-25

## 2023-10-10 ENCOUNTER — TRANSCRIPTION ENCOUNTER (OUTPATIENT)
Age: 53
End: 2023-10-10

## 2023-10-13 ENCOUNTER — APPOINTMENT (OUTPATIENT)
Dept: CT IMAGING | Facility: CLINIC | Age: 53
End: 2023-10-13

## 2023-10-17 ENCOUNTER — TRANSCRIPTION ENCOUNTER (OUTPATIENT)
Age: 53
End: 2023-10-17

## 2023-11-13 NOTE — SBIRT NOTE ADULT - NSSBIRTDRGWITHDRSX_GEN_A_CORE
[facility] faxed a 90 Day Prescription Request for CARVEDILOL 25MG TABLETS.    Original Quantity: 60  Quantity Requested: 180   No

## 2023-11-17 ENCOUNTER — TRANSCRIPTION ENCOUNTER (OUTPATIENT)
Age: 53
End: 2023-11-17

## 2023-12-26 ENCOUNTER — TRANSCRIPTION ENCOUNTER (OUTPATIENT)
Age: 53
End: 2023-12-26

## 2024-01-09 ENCOUNTER — APPOINTMENT (OUTPATIENT)
Dept: CARDIOLOGY | Facility: CLINIC | Age: 54
End: 2024-01-09
Payer: MEDICAID

## 2024-01-09 ENCOUNTER — NON-APPOINTMENT (OUTPATIENT)
Age: 54
End: 2024-01-09

## 2024-01-09 VITALS
HEART RATE: 86 BPM | WEIGHT: 240 LBS | OXYGEN SATURATION: 95 % | SYSTOLIC BLOOD PRESSURE: 118 MMHG | BODY MASS INDEX: 44.16 KG/M2 | HEIGHT: 62 IN | DIASTOLIC BLOOD PRESSURE: 62 MMHG

## 2024-01-09 DIAGNOSIS — R00.0 TACHYCARDIA, UNSPECIFIED: ICD-10-CM

## 2024-01-09 DIAGNOSIS — E78.00 PURE HYPERCHOLESTEROLEMIA, UNSPECIFIED: ICD-10-CM

## 2024-01-09 DIAGNOSIS — I10 ESSENTIAL (PRIMARY) HYPERTENSION: ICD-10-CM

## 2024-01-09 DIAGNOSIS — R60.0 LOCALIZED EDEMA: ICD-10-CM

## 2024-01-09 PROCEDURE — 93000 ELECTROCARDIOGRAM COMPLETE: CPT

## 2024-01-09 PROCEDURE — 99214 OFFICE O/P EST MOD 30 MIN: CPT | Mod: 25

## 2024-01-09 RX ORDER — METHOCARBAMOL 500 MG/1
500 TABLET, FILM COATED ORAL
Qty: 120 | Refills: 0 | Status: DISCONTINUED | COMMUNITY
Start: 2023-04-27 | End: 2024-01-09

## 2024-01-09 RX ORDER — GABAPENTIN 400 MG/1
400 CAPSULE ORAL
Qty: 90 | Refills: 0 | Status: DISCONTINUED | COMMUNITY
Start: 2022-08-11 | End: 2024-01-09

## 2024-01-09 RX ORDER — METHOCARBAMOL 500 MG/1
500 TABLET, FILM COATED ORAL 4 TIMES DAILY
Qty: 120 | Refills: 0 | Status: DISCONTINUED | COMMUNITY
Start: 2021-10-26 | End: 2024-01-09

## 2024-01-09 RX ORDER — GABAPENTIN 600 MG/1
600 TABLET, COATED ORAL 3 TIMES DAILY
Refills: 0 | Status: ACTIVE | COMMUNITY

## 2024-01-09 RX ORDER — BUPROPION HYDROCHLORIDE 300 MG/1
300 TABLET, EXTENDED RELEASE ORAL DAILY
Refills: 0 | Status: ACTIVE | COMMUNITY

## 2024-01-09 RX ORDER — CELECOXIB 100 MG/1
100 CAPSULE ORAL TWICE DAILY
Qty: 60 | Refills: 0 | Status: DISCONTINUED | COMMUNITY
Start: 2023-05-10 | End: 2024-01-09

## 2024-01-09 RX ORDER — HYDROCHLOROTHIAZIDE 25 MG/1
25 TABLET ORAL DAILY
Qty: 90 | Refills: 1 | Status: DISCONTINUED | COMMUNITY
Start: 2023-01-31 | End: 2024-01-09

## 2024-01-09 RX ORDER — DILTIAZEM HYDROCHLORIDE 180 MG/1
180 CAPSULE, EXTENDED RELEASE ORAL
Qty: 90 | Refills: 3 | Status: ACTIVE | COMMUNITY
Start: 2021-10-24 | End: 1900-01-01

## 2024-01-09 RX ORDER — CELECOXIB 200 MG/1
200 CAPSULE ORAL
Qty: 60 | Refills: 0 | Status: DISCONTINUED | COMMUNITY
Start: 2022-12-12 | End: 2024-01-09

## 2024-01-09 RX ORDER — METHOCARBAMOL 750 MG/1
750 TABLET, FILM COATED ORAL
Qty: 180 | Refills: 0 | Status: DISCONTINUED | COMMUNITY
Start: 2023-05-10 | End: 2024-01-09

## 2024-01-09 RX ORDER — CYCLOBENZAPRINE HYDROCHLORIDE 7.5 MG/1
TABLET, FILM COATED ORAL
Refills: 0 | Status: DISCONTINUED | COMMUNITY
End: 2024-01-09

## 2024-01-09 RX ORDER — METHOCARBAMOL 750 MG/1
750 TABLET, FILM COATED ORAL 3 TIMES DAILY
Qty: 180 | Refills: 0 | Status: DISCONTINUED | COMMUNITY
Start: 2023-09-20 | End: 2024-01-09

## 2024-01-09 RX ORDER — CELECOXIB 200 MG/1
200 CAPSULE ORAL
Qty: 60 | Refills: 0 | Status: DISCONTINUED | COMMUNITY
Start: 2023-04-27 | End: 2024-01-09

## 2024-01-09 RX ORDER — CELECOXIB 100 MG/1
100 CAPSULE ORAL TWICE DAILY
Qty: 60 | Refills: 0 | Status: DISCONTINUED | COMMUNITY
Start: 2023-07-13 | End: 2024-01-09

## 2024-01-09 RX ORDER — CELECOXIB 100 MG/1
100 CAPSULE ORAL TWICE DAILY
Qty: 60 | Refills: 0 | Status: DISCONTINUED | COMMUNITY
Start: 2023-03-30 | End: 2024-01-09

## 2024-01-09 RX ORDER — CELECOXIB 200 MG/1
200 CAPSULE ORAL
Qty: 60 | Refills: 0 | Status: DISCONTINUED | COMMUNITY
Start: 2023-09-20 | End: 2024-01-09

## 2024-01-09 RX ORDER — METHOCARBAMOL 750 MG/1
750 TABLET, FILM COATED ORAL 3 TIMES DAILY
Qty: 180 | Refills: 0 | Status: DISCONTINUED | COMMUNITY
Start: 2023-03-30 | End: 2024-01-09

## 2024-02-02 ENCOUNTER — APPOINTMENT (OUTPATIENT)
Dept: CARDIOLOGY | Facility: CLINIC | Age: 54
End: 2024-02-02

## 2024-02-13 ENCOUNTER — OUTPATIENT (OUTPATIENT)
Dept: OUTPATIENT SERVICES | Facility: HOSPITAL | Age: 54
LOS: 1 days | End: 2024-02-13

## 2024-02-13 DIAGNOSIS — D72.829 ELEVATED WHITE BLOOD CELL COUNT, UNSPECIFIED: ICD-10-CM

## 2024-02-13 DIAGNOSIS — Z90.49 ACQUIRED ABSENCE OF OTHER SPECIFIED PARTS OF DIGESTIVE TRACT: Chronic | ICD-10-CM

## 2024-02-16 ENCOUNTER — APPOINTMENT (OUTPATIENT)
Age: 54
End: 2024-02-16

## 2024-02-20 ENCOUNTER — NON-APPOINTMENT (OUTPATIENT)
Age: 54
End: 2024-02-20

## 2024-03-07 ENCOUNTER — APPOINTMENT (OUTPATIENT)
Dept: CARDIOLOGY | Facility: CLINIC | Age: 54
End: 2024-03-07
Payer: MEDICAID

## 2024-03-07 PROCEDURE — 93306 TTE W/DOPPLER COMPLETE: CPT

## 2024-04-08 ENCOUNTER — APPOINTMENT (OUTPATIENT)
Dept: CT IMAGING | Facility: CLINIC | Age: 54
End: 2024-04-08

## 2024-05-07 ENCOUNTER — OUTPATIENT (OUTPATIENT)
Dept: OUTPATIENT SERVICES | Facility: HOSPITAL | Age: 54
LOS: 1 days | End: 2024-05-07

## 2024-05-07 DIAGNOSIS — D72.829 ELEVATED WHITE BLOOD CELL COUNT, UNSPECIFIED: ICD-10-CM

## 2024-05-07 DIAGNOSIS — Z90.49 ACQUIRED ABSENCE OF OTHER SPECIFIED PARTS OF DIGESTIVE TRACT: Chronic | ICD-10-CM

## 2024-05-24 ENCOUNTER — APPOINTMENT (OUTPATIENT)
Age: 54
End: 2024-05-24

## 2024-05-24 DIAGNOSIS — D72.829 ELEVATED WHITE BLOOD CELL COUNT, UNSPECIFIED: ICD-10-CM

## 2024-05-24 NOTE — PHYSICAL EXAM
[Restricted in physically strenuous activity but ambulatory and able to carry out work of a light or sedentary nature] : Status 1- Restricted in physically strenuous activity but ambulatory and able to carry out work of a light or sedentary nature, e.g., light house work, office work [Obese] : obese [Normal] : affect appropriate [de-identified] : anicteric [de-identified] : Decreased air entry bilaterally with diffuse expiratory wheezing

## 2024-05-24 NOTE — HISTORY OF PRESENT ILLNESS
[de-identified] : Jose G has a long-standing history of degenerative back disease, hypertension and hyperlipidemia. He follows closely with Dr. Rojas. On recent blood work, he was found to have an elevated white blood cell count of 16.5, with a predominance of neutrophils. He was also noted to have a mildly elevated hemoglobin of 17.2g. A C-reactive protein checked at that time was elevated.\par  \par  He has previously seen a rheumatologist, with work-up for possible inflammatory arthritis reportedly unrevealing. He is pending neurosurgical evaluation. He has bilateral nodular lesions on the extensor surfaces of his forearms, which have developed after chronically leaning on his forearms due to his back issues.\par  \par  Jose G smokes 2-3 packs of cigarettes per day, is pending sleep study for possible sleep apnea. \par  \par  He denies any ongoing fevers or chills, no headache, no lumps or bumps, no weight loss, no bleeding or bruising. He is not on any steroids, though has been previously for COPD.\par  \par

## 2025-02-05 ENCOUNTER — APPOINTMENT (OUTPATIENT)
Dept: CARDIOLOGY | Facility: CLINIC | Age: 55
End: 2025-02-05
Payer: MEDICAID

## 2025-02-05 ENCOUNTER — NON-APPOINTMENT (OUTPATIENT)
Age: 55
End: 2025-02-05

## 2025-02-05 VITALS
HEIGHT: 63 IN | WEIGHT: 241 LBS | SYSTOLIC BLOOD PRESSURE: 144 MMHG | OXYGEN SATURATION: 97 % | BODY MASS INDEX: 42.7 KG/M2 | HEART RATE: 101 BPM | DIASTOLIC BLOOD PRESSURE: 82 MMHG

## 2025-02-05 DIAGNOSIS — I10 ESSENTIAL (PRIMARY) HYPERTENSION: ICD-10-CM

## 2025-02-05 DIAGNOSIS — R00.0 TACHYCARDIA, UNSPECIFIED: ICD-10-CM

## 2025-02-05 DIAGNOSIS — E78.00 PURE HYPERCHOLESTEROLEMIA, UNSPECIFIED: ICD-10-CM

## 2025-02-05 DIAGNOSIS — R60.0 LOCALIZED EDEMA: ICD-10-CM

## 2025-02-05 PROCEDURE — G2211 COMPLEX E/M VISIT ADD ON: CPT | Mod: NC

## 2025-02-05 PROCEDURE — 93000 ELECTROCARDIOGRAM COMPLETE: CPT

## 2025-02-05 PROCEDURE — 99214 OFFICE O/P EST MOD 30 MIN: CPT

## 2025-02-10 ENCOUNTER — NON-APPOINTMENT (OUTPATIENT)
Age: 55
End: 2025-02-10

## 2025-02-10 VITALS — HEIGHT: 63 IN | BODY MASS INDEX: 42.7 KG/M2 | WEIGHT: 241 LBS

## 2025-02-10 DIAGNOSIS — F17.210 NICOTINE DEPENDENCE, CIGARETTES, UNCOMPLICATED: ICD-10-CM

## 2025-02-13 RX ORDER — EVOLOCUMAB 140 MG/ML
140 INJECTION, SOLUTION SUBCUTANEOUS
Qty: 6 | Refills: 3 | Status: ACTIVE | COMMUNITY
Start: 2025-02-05

## 2025-02-14 ENCOUNTER — APPOINTMENT (OUTPATIENT)
Dept: CT IMAGING | Facility: CLINIC | Age: 55
End: 2025-02-14

## 2025-02-14 PROCEDURE — 71271 CT THORAX LUNG CANCER SCR C-: CPT

## 2025-02-18 LAB — HBA1C MFR BLD HPLC: 7

## 2025-02-25 ENCOUNTER — APPOINTMENT (OUTPATIENT)
Dept: NEUROSURGERY | Facility: CLINIC | Age: 55
End: 2025-02-25

## 2025-05-20 ENCOUNTER — APPOINTMENT (OUTPATIENT)
Dept: PODIATRY | Facility: CLINIC | Age: 55
End: 2025-05-20
Payer: MEDICAID

## 2025-05-20 DIAGNOSIS — R23.4 CHANGES IN SKIN TEXTURE: ICD-10-CM

## 2025-05-20 DIAGNOSIS — F41.9 ANXIETY DISORDER, UNSPECIFIED: ICD-10-CM

## 2025-05-20 DIAGNOSIS — Q66.6 OTHER CONGENITAL VALGUS DEFORMITIES OF FEET: ICD-10-CM

## 2025-05-20 DIAGNOSIS — L85.3 XEROSIS CUTIS: ICD-10-CM

## 2025-05-20 DIAGNOSIS — F32.A ANXIETY DISORDER, UNSPECIFIED: ICD-10-CM

## 2025-05-20 DIAGNOSIS — M77.51 OTHER ENTHESOPATHY OF RT FOOT AND ANKLE: ICD-10-CM

## 2025-05-20 PROCEDURE — 99204 OFFICE O/P NEW MOD 45 MIN: CPT

## 2025-05-20 RX ORDER — TRAZODONE HYDROCHLORIDE 150 MG/1
150 TABLET ORAL
Refills: 0 | Status: ACTIVE | COMMUNITY

## 2025-05-22 PROBLEM — M77.51 BURSITIS OF RIGHT FOOT: Status: ACTIVE | Noted: 2025-05-22

## 2025-05-22 PROBLEM — R23.4 FISSURE IN SKIN OF FOOT: Status: ACTIVE | Noted: 2025-05-22

## 2025-05-22 PROBLEM — Q66.6 VALGUS DEFORMITIES OF FEET, CONGENITAL: Status: ACTIVE | Noted: 2025-05-22

## 2025-05-22 PROBLEM — L85.3 XEROSIS CUTIS: Status: ACTIVE | Noted: 2025-05-22

## 2025-06-24 ENCOUNTER — APPOINTMENT (OUTPATIENT)
Dept: VASCULAR SURGERY | Facility: CLINIC | Age: 55
End: 2025-06-24

## 2025-07-08 ENCOUNTER — APPOINTMENT (OUTPATIENT)
Dept: PODIATRY | Facility: CLINIC | Age: 55
End: 2025-07-08

## 2025-07-11 ENCOUNTER — APPOINTMENT (OUTPATIENT)
Dept: VASCULAR SURGERY | Facility: CLINIC | Age: 55
End: 2025-07-11

## 2025-08-05 ENCOUNTER — APPOINTMENT (OUTPATIENT)
Dept: CARDIOLOGY | Facility: CLINIC | Age: 55
End: 2025-08-05

## 2025-08-19 ENCOUNTER — APPOINTMENT (OUTPATIENT)
Dept: PODIATRY | Facility: CLINIC | Age: 55
End: 2025-08-19

## 2025-08-22 ENCOUNTER — APPOINTMENT (OUTPATIENT)
Dept: VASCULAR SURGERY | Facility: CLINIC | Age: 55
End: 2025-08-22

## 2025-08-26 ENCOUNTER — APPOINTMENT (OUTPATIENT)
Dept: CARDIOLOGY | Facility: CLINIC | Age: 55
End: 2025-08-26